# Patient Record
Sex: FEMALE | Race: WHITE | NOT HISPANIC OR LATINO | Employment: FULL TIME | URBAN - METROPOLITAN AREA
[De-identification: names, ages, dates, MRNs, and addresses within clinical notes are randomized per-mention and may not be internally consistent; named-entity substitution may affect disease eponyms.]

---

## 2023-09-22 ENCOUNTER — OFFICE VISIT (OUTPATIENT)
Dept: MEDICAL GROUP | Facility: PHYSICIAN GROUP | Age: 33
End: 2023-09-22
Payer: COMMERCIAL

## 2023-09-22 VITALS
HEIGHT: 64 IN | TEMPERATURE: 97 F | HEART RATE: 83 BPM | WEIGHT: 214 LBS | RESPIRATION RATE: 14 BRPM | OXYGEN SATURATION: 98 % | SYSTOLIC BLOOD PRESSURE: 126 MMHG | BODY MASS INDEX: 36.54 KG/M2 | DIASTOLIC BLOOD PRESSURE: 80 MMHG

## 2023-09-22 DIAGNOSIS — Z00.00 HEALTH CARE MAINTENANCE: ICD-10-CM

## 2023-09-22 DIAGNOSIS — Z11.3 ENCOUNTER FOR SPECIAL SCREENING EXAMINATION FOR INFECTION WITH PREDOMINANTLY SEXUAL MODE OF TRANSMISSION: ICD-10-CM

## 2023-09-22 DIAGNOSIS — K62.5 BRIGHT RED RECTAL BLEEDING: ICD-10-CM

## 2023-09-22 DIAGNOSIS — Z87.440 HISTORY OF RECURRENT UTIS: ICD-10-CM

## 2023-09-22 DIAGNOSIS — R19.7 INTERMITTENT DIARRHEA: ICD-10-CM

## 2023-09-22 PROBLEM — N63.10 BREAST MASS, RIGHT: Status: ACTIVE | Noted: 2023-09-22

## 2023-09-22 PROBLEM — E66.812 CLASS 2 OBESITY WITHOUT SERIOUS COMORBIDITY WITH BODY MASS INDEX (BMI) OF 37.0 TO 37.9 IN ADULT: Status: ACTIVE | Noted: 2023-09-22

## 2023-09-22 PROBLEM — E66.9 CLASS 2 OBESITY WITHOUT SERIOUS COMORBIDITY WITH BODY MASS INDEX (BMI) OF 37.0 TO 37.9 IN ADULT: Status: ACTIVE | Noted: 2023-09-22

## 2023-09-22 PROCEDURE — 99204 OFFICE O/P NEW MOD 45 MIN: CPT | Performed by: STUDENT IN AN ORGANIZED HEALTH CARE EDUCATION/TRAINING PROGRAM

## 2023-09-22 PROCEDURE — 3074F SYST BP LT 130 MM HG: CPT | Performed by: STUDENT IN AN ORGANIZED HEALTH CARE EDUCATION/TRAINING PROGRAM

## 2023-09-22 PROCEDURE — 3079F DIAST BP 80-89 MM HG: CPT | Performed by: STUDENT IN AN ORGANIZED HEALTH CARE EDUCATION/TRAINING PROGRAM

## 2023-09-22 SDOH — ECONOMIC STABILITY: INCOME INSECURITY: HOW HARD IS IT FOR YOU TO PAY FOR THE VERY BASICS LIKE FOOD, HOUSING, MEDICAL CARE, AND HEATING?: NOT VERY HARD

## 2023-09-22 SDOH — ECONOMIC STABILITY: HOUSING INSECURITY: IN THE LAST 12 MONTHS, HOW MANY PLACES HAVE YOU LIVED?: 1

## 2023-09-22 SDOH — ECONOMIC STABILITY: FOOD INSECURITY: WITHIN THE PAST 12 MONTHS, YOU WORRIED THAT YOUR FOOD WOULD RUN OUT BEFORE YOU GOT MONEY TO BUY MORE.: NEVER TRUE

## 2023-09-22 SDOH — HEALTH STABILITY: PHYSICAL HEALTH: ON AVERAGE, HOW MANY DAYS PER WEEK DO YOU ENGAGE IN MODERATE TO STRENUOUS EXERCISE (LIKE A BRISK WALK)?: 5 DAYS

## 2023-09-22 SDOH — ECONOMIC STABILITY: TRANSPORTATION INSECURITY
IN THE PAST 12 MONTHS, HAS LACK OF TRANSPORTATION KEPT YOU FROM MEETINGS, WORK, OR FROM GETTING THINGS NEEDED FOR DAILY LIVING?: NO

## 2023-09-22 SDOH — ECONOMIC STABILITY: HOUSING INSECURITY
IN THE LAST 12 MONTHS, WAS THERE A TIME WHEN YOU DID NOT HAVE A STEADY PLACE TO SLEEP OR SLEPT IN A SHELTER (INCLUDING NOW)?: NO

## 2023-09-22 SDOH — ECONOMIC STABILITY: TRANSPORTATION INSECURITY
IN THE PAST 12 MONTHS, HAS THE LACK OF TRANSPORTATION KEPT YOU FROM MEDICAL APPOINTMENTS OR FROM GETTING MEDICATIONS?: NO

## 2023-09-22 SDOH — ECONOMIC STABILITY: FOOD INSECURITY: WITHIN THE PAST 12 MONTHS, THE FOOD YOU BOUGHT JUST DIDN'T LAST AND YOU DIDN'T HAVE MONEY TO GET MORE.: NEVER TRUE

## 2023-09-22 SDOH — ECONOMIC STABILITY: INCOME INSECURITY: IN THE LAST 12 MONTHS, WAS THERE A TIME WHEN YOU WERE NOT ABLE TO PAY THE MORTGAGE OR RENT ON TIME?: NO

## 2023-09-22 SDOH — HEALTH STABILITY: PHYSICAL HEALTH: ON AVERAGE, HOW MANY MINUTES DO YOU ENGAGE IN EXERCISE AT THIS LEVEL?: 20 MIN

## 2023-09-22 SDOH — ECONOMIC STABILITY: TRANSPORTATION INSECURITY
IN THE PAST 12 MONTHS, HAS LACK OF RELIABLE TRANSPORTATION KEPT YOU FROM MEDICAL APPOINTMENTS, MEETINGS, WORK OR FROM GETTING THINGS NEEDED FOR DAILY LIVING?: NO

## 2023-09-22 SDOH — HEALTH STABILITY: MENTAL HEALTH
STRESS IS WHEN SOMEONE FEELS TENSE, NERVOUS, ANXIOUS, OR CAN'T SLEEP AT NIGHT BECAUSE THEIR MIND IS TROUBLED. HOW STRESSED ARE YOU?: ONLY A LITTLE

## 2023-09-22 ASSESSMENT — SOCIAL DETERMINANTS OF HEALTH (SDOH)
DO YOU BELONG TO ANY CLUBS OR ORGANIZATIONS SUCH AS CHURCH GROUPS UNIONS, FRATERNAL OR ATHLETIC GROUPS, OR SCHOOL GROUPS?: NO
DO YOU BELONG TO ANY CLUBS OR ORGANIZATIONS SUCH AS CHURCH GROUPS UNIONS, FRATERNAL OR ATHLETIC GROUPS, OR SCHOOL GROUPS?: NO
IN A TYPICAL WEEK, HOW MANY TIMES DO YOU TALK ON THE PHONE WITH FAMILY, FRIENDS, OR NEIGHBORS?: TWICE A WEEK
WITHIN THE PAST 12 MONTHS, YOU WORRIED THAT YOUR FOOD WOULD RUN OUT BEFORE YOU GOT THE MONEY TO BUY MORE: NEVER TRUE
HOW OFTEN DO YOU ATTENT MEETINGS OF THE CLUB OR ORGANIZATION YOU BELONG TO?: NEVER
HOW HARD IS IT FOR YOU TO PAY FOR THE VERY BASICS LIKE FOOD, HOUSING, MEDICAL CARE, AND HEATING?: NOT VERY HARD
IN A TYPICAL WEEK, HOW MANY TIMES DO YOU TALK ON THE PHONE WITH FAMILY, FRIENDS, OR NEIGHBORS?: TWICE A WEEK
HOW OFTEN DO YOU GET TOGETHER WITH FRIENDS OR RELATIVES?: ONCE A WEEK
HOW OFTEN DO YOU ATTEND CHURCH OR RELIGIOUS SERVICES?: 1 TO 4 TIMES PER YEAR
HOW OFTEN DO YOU ATTEND CHURCH OR RELIGIOUS SERVICES?: 1 TO 4 TIMES PER YEAR
HOW OFTEN DO YOU HAVE SIX OR MORE DRINKS ON ONE OCCASION: LESS THAN MONTHLY
HOW OFTEN DO YOU GET TOGETHER WITH FRIENDS OR RELATIVES?: ONCE A WEEK
HOW OFTEN DO YOU ATTENT MEETINGS OF THE CLUB OR ORGANIZATION YOU BELONG TO?: NEVER
HOW MANY DRINKS CONTAINING ALCOHOL DO YOU HAVE ON A TYPICAL DAY WHEN YOU ARE DRINKING: 1 OR 2
HOW OFTEN DO YOU HAVE A DRINK CONTAINING ALCOHOL: 2-4 TIMES A MONTH

## 2023-09-22 ASSESSMENT — ENCOUNTER SYMPTOMS
WEIGHT LOSS: 0
FLANK PAIN: 0
SHORTNESS OF BREATH: 0
ABDOMINAL PAIN: 0
FEVER: 0
HEARTBURN: 0
COUGH: 1
HEADACHES: 0
DIARRHEA: 1
CONSTIPATION: 0
VOMITING: 0
CHILLS: 0
BLOOD IN STOOL: 1
NAUSEA: 0
PALPITATIONS: 0

## 2023-09-22 ASSESSMENT — LIFESTYLE VARIABLES
HOW OFTEN DO YOU HAVE A DRINK CONTAINING ALCOHOL: 2-4 TIMES A MONTH
HOW OFTEN DO YOU HAVE SIX OR MORE DRINKS ON ONE OCCASION: LESS THAN MONTHLY
SKIP TO QUESTIONS 9-10: 0
AUDIT-C TOTAL SCORE: 3
HOW MANY STANDARD DRINKS CONTAINING ALCOHOL DO YOU HAVE ON A TYPICAL DAY: 1 OR 2

## 2023-09-22 ASSESSMENT — PATIENT HEALTH QUESTIONNAIRE - PHQ9: CLINICAL INTERPRETATION OF PHQ2 SCORE: 0

## 2023-09-22 NOTE — PATIENT INSTRUCTIONS
I will be transferring to the clinic below October 2023.  [facility]  [address] [place] [address]     Bring records in    fasting labs due 1-2 weeks prior to follow up

## 2023-09-22 NOTE — PROGRESS NOTES
Subjective:     Chief Complaint   Patient presents with    Establish Care     New Pt      HISTORY OF THE PRESENT ILLNESS: Patient is a 32 y.o. female. This pleasant patient is here today to establish care and discuss STI testing. His/her prior PCP years ago, no recent PCP.    Patient without any acute concerns today but we did go over her history.  States that she has had chronic painless bright red rectal bleeding for quite some time.  At times not in association with intermittent diarrhea which seems to be triggered by certain foods such as sweets or things with lactose.  Avoids those in general.  Denies any abdominal pain or other GI complaints.  No family history of inflammatory bowel disease.  Does not seem to have any issues with bread or gluten.  No family history of colon cancer.    Patient requests screening for STI.  Reports that she and her spouse have intercourse with other partners periodically throughout the year, uses condoms.  Denies any STI symptoms currently.  No vaginal discharge.  Having regular periods.   had a vasectomy.    Patient does report a history of chronic UTI, improved from prior.  States that she has had issues with this since 17 years old and never saw a specialist for it.  Could be better with hydration but always wipes front to back and urinates after intercourse.  No current UTI symptoms.    Health Maintenance: Bring in records, declines flu vaccine.    Review of Systems   Constitutional:  Negative for chills, fever, malaise/fatigue and weight loss.        No cold/heat intolerance   Respiratory:  Positive for cough (dry cough this AM). Negative for shortness of breath.    Cardiovascular:  Negative for chest pain, palpitations and leg swelling.   Gastrointestinal:  Positive for blood in stool and diarrhea (only with certain foods (sweets/lactose)). Negative for abdominal pain, constipation, heartburn, nausea and vomiting.   Genitourinary:  Negative for dysuria, flank pain,  "frequency, hematuria and urgency.   Neurological:  Negative for headaches.     Objective:     Exam: /80 (BP Location: Left arm, Patient Position: Sitting, BP Cuff Size: Adult)   Pulse 83   Temp 36.1 °C (97 °F) (Temporal)   Resp 14   Ht 1.619 m (5' 3.75\")   Wt 97.1 kg (214 lb)   SpO2 98%  Body mass index is 37.02 kg/m².    Physical Exam  Vitals reviewed.   Constitutional:       General: She is not in acute distress.     Appearance: Normal appearance. She is obese. She is not ill-appearing.   HENT:      Head: Normocephalic and atraumatic.      Mouth/Throat:      Mouth: Mucous membranes are moist.   Eyes:      General: No scleral icterus.  Neck:      Thyroid: No thyroid mass, thyromegaly or thyroid tenderness.   Cardiovascular:      Rate and Rhythm: Normal rate and regular rhythm.      Heart sounds: Normal heart sounds.   Pulmonary:      Effort: Pulmonary effort is normal.      Breath sounds: Normal breath sounds.   Abdominal:      General: Abdomen is flat. Bowel sounds are normal. There is no distension.      Palpations: Abdomen is soft. There is no mass.      Tenderness: There is no abdominal tenderness. There is no guarding or rebound.   Genitourinary:     Comments: Declines rectal exam  Musculoskeletal:      Cervical back: Normal range of motion and neck supple. No rigidity or tenderness.   Lymphadenopathy:      Cervical: No cervical adenopathy.   Skin:     General: Skin is warm and dry.      Coloration: Skin is not jaundiced.   Neurological:      General: No focal deficit present.      Mental Status: She is alert and oriented to person, place, and time.   Psychiatric:         Mood and Affect: Mood normal.         Behavior: Behavior normal.         Thought Content: Thought content normal.       Assessment & Plan:   32 y.o. female with the following -    1. History of recurrent UTIs  This is a chronic condition, last UTI a few months ago and only twice this year so improved from prior. No current " symptoms. Discussed UTI prevention, would benefit from urology evaluation since this has been chronic since 17.  - Referral to Urology    2. Bright red rectal bleeding  3. Intermittent diarrhea  These are chronic conditions, patient declines rectal exam today but will plan for follow-up.  Labs as below.  Patient to continue to avoid foods that are triggering for diarrhea.  May consider referral to GI if indicated but no current red flags.  - Comp Metabolic Panel; Future  - TSH WITH REFLEX TO FT4; Future  - CBC WITH DIFFERENTIAL; Future  - CELIAC DISEASE AB PANEL; Future    4. Encounter for special screening examination for infection with predominantly sexual mode of transmission  STI testing requested by patient, asymptomatic.  Aware of importance of condom use.  - Chlamydia/GC, PCR (Urine); Future  - HIV AG/AB COMBO ASSAY SCREENING; Future  - HEP C VIRUS ANTIBODY; Future  - HEP B SURFACE ANTIGEN; Future  - T.PALLIDUM AB KAREN (SCREENING); Future    5. Health care maintenance  - Comp Metabolic Panel; Future  - TSH WITH REFLEX TO FT4; Future  - Lipid Profile; Future  - VITAMIN D,25 HYDROXY (DEFICIENCY); Future  - CBC WITH DIFFERENTIAL; Future  - HEMOGLOBIN A1C; Future    Return if symptoms worsen or fail to improve, for Annual with PAP.    Please note that this dictation was created using voice recognition software. I have made every reasonable attempt to correct obvious errors, but I expect that there are errors of grammar and possibly content that I did not discover before finalizing the note.

## 2023-10-05 ENCOUNTER — HOSPITAL ENCOUNTER (OUTPATIENT)
Dept: LAB | Facility: MEDICAL CENTER | Age: 33
End: 2023-10-05
Attending: STUDENT IN AN ORGANIZED HEALTH CARE EDUCATION/TRAINING PROGRAM
Payer: COMMERCIAL

## 2023-10-05 DIAGNOSIS — K62.5 BRIGHT RED RECTAL BLEEDING: ICD-10-CM

## 2023-10-05 DIAGNOSIS — Z00.00 HEALTH CARE MAINTENANCE: ICD-10-CM

## 2023-10-05 DIAGNOSIS — R19.7 INTERMITTENT DIARRHEA: ICD-10-CM

## 2023-10-05 DIAGNOSIS — Z11.3 ENCOUNTER FOR SPECIAL SCREENING EXAMINATION FOR INFECTION WITH PREDOMINANTLY SEXUAL MODE OF TRANSMISSION: ICD-10-CM

## 2023-10-05 LAB
25(OH)D3 SERPL-MCNC: 16 NG/ML (ref 30–100)
ALBUMIN SERPL BCP-MCNC: 4.2 G/DL (ref 3.2–4.9)
ALBUMIN/GLOB SERPL: 1.4 G/DL
ALP SERPL-CCNC: 78 U/L (ref 30–99)
ALT SERPL-CCNC: 15 U/L (ref 2–50)
ANION GAP SERPL CALC-SCNC: 12 MMOL/L (ref 7–16)
AST SERPL-CCNC: 18 U/L (ref 12–45)
BASOPHILS # BLD AUTO: 0.5 % (ref 0–1.8)
BASOPHILS # BLD: 0.05 K/UL (ref 0–0.12)
BILIRUB SERPL-MCNC: 0.4 MG/DL (ref 0.1–1.5)
BUN SERPL-MCNC: 16 MG/DL (ref 8–22)
CALCIUM ALBUM COR SERPL-MCNC: 9.2 MG/DL (ref 8.5–10.5)
CALCIUM SERPL-MCNC: 9.4 MG/DL (ref 8.5–10.5)
CHLORIDE SERPL-SCNC: 105 MMOL/L (ref 96–112)
CHOLEST SERPL-MCNC: 248 MG/DL (ref 100–199)
CO2 SERPL-SCNC: 20 MMOL/L (ref 20–33)
CREAT SERPL-MCNC: 0.81 MG/DL (ref 0.5–1.4)
EOSINOPHIL # BLD AUTO: 0.18 K/UL (ref 0–0.51)
EOSINOPHIL NFR BLD: 1.8 % (ref 0–6.9)
ERYTHROCYTE [DISTWIDTH] IN BLOOD BY AUTOMATED COUNT: 42.4 FL (ref 35.9–50)
EST. AVERAGE GLUCOSE BLD GHB EST-MCNC: 100 MG/DL
FASTING STATUS PATIENT QL REPORTED: NORMAL
GFR SERPLBLD CREATININE-BSD FMLA CKD-EPI: 99 ML/MIN/1.73 M 2
GLOBULIN SER CALC-MCNC: 3.1 G/DL (ref 1.9–3.5)
GLUCOSE SERPL-MCNC: 75 MG/DL (ref 65–99)
HBA1C MFR BLD: 5.1 % (ref 4–5.6)
HBV SURFACE AG SER QL: NORMAL
HCT VFR BLD AUTO: 44.5 % (ref 37–47)
HCV AB SER QL: NORMAL
HDLC SERPL-MCNC: 67 MG/DL
HGB BLD-MCNC: 14.9 G/DL (ref 12–16)
HIV 1+2 AB+HIV1 P24 AG SERPL QL IA: NORMAL
IMM GRANULOCYTES # BLD AUTO: 0.02 K/UL (ref 0–0.11)
IMM GRANULOCYTES NFR BLD AUTO: 0.2 % (ref 0–0.9)
LDLC SERPL CALC-MCNC: 159 MG/DL
LYMPHOCYTES # BLD AUTO: 3.15 K/UL (ref 1–4.8)
LYMPHOCYTES NFR BLD: 31 % (ref 22–41)
MCH RBC QN AUTO: 29.9 PG (ref 27–33)
MCHC RBC AUTO-ENTMCNC: 33.5 G/DL (ref 32.2–35.5)
MCV RBC AUTO: 89.2 FL (ref 81.4–97.8)
MONOCYTES # BLD AUTO: 0.59 K/UL (ref 0–0.85)
MONOCYTES NFR BLD AUTO: 5.8 % (ref 0–13.4)
NEUTROPHILS # BLD AUTO: 6.16 K/UL (ref 1.82–7.42)
NEUTROPHILS NFR BLD: 60.7 % (ref 44–72)
NRBC # BLD AUTO: 0 K/UL
NRBC BLD-RTO: 0 /100 WBC (ref 0–0.2)
PLATELET # BLD AUTO: 339 K/UL (ref 164–446)
PMV BLD AUTO: 10.6 FL (ref 9–12.9)
POTASSIUM SERPL-SCNC: 3.9 MMOL/L (ref 3.6–5.5)
PROT SERPL-MCNC: 7.3 G/DL (ref 6–8.2)
RBC # BLD AUTO: 4.99 M/UL (ref 4.2–5.4)
SODIUM SERPL-SCNC: 137 MMOL/L (ref 135–145)
T PALLIDUM AB SER QL IA: NORMAL
TRIGL SERPL-MCNC: 108 MG/DL (ref 0–149)
TSH SERPL DL<=0.005 MIU/L-ACNC: 2.31 UIU/ML (ref 0.38–5.33)
WBC # BLD AUTO: 10.2 K/UL (ref 4.8–10.8)

## 2023-10-05 PROCEDURE — 86364 TISS TRNSGLTMNASE EA IG CLAS: CPT

## 2023-10-05 PROCEDURE — 87340 HEPATITIS B SURFACE AG IA: CPT

## 2023-10-05 PROCEDURE — 87591 N.GONORRHOEAE DNA AMP PROB: CPT

## 2023-10-05 PROCEDURE — 86780 TREPONEMA PALLIDUM: CPT

## 2023-10-05 PROCEDURE — 82784 ASSAY IGA/IGD/IGG/IGM EACH: CPT

## 2023-10-05 PROCEDURE — 86803 HEPATITIS C AB TEST: CPT

## 2023-10-05 PROCEDURE — 87389 HIV-1 AG W/HIV-1&-2 AB AG IA: CPT

## 2023-10-05 PROCEDURE — 80053 COMPREHEN METABOLIC PANEL: CPT

## 2023-10-05 PROCEDURE — 85025 COMPLETE CBC W/AUTO DIFF WBC: CPT

## 2023-10-05 PROCEDURE — 84443 ASSAY THYROID STIM HORMONE: CPT

## 2023-10-05 PROCEDURE — 83036 HEMOGLOBIN GLYCOSYLATED A1C: CPT

## 2023-10-05 PROCEDURE — 87491 CHLMYD TRACH DNA AMP PROBE: CPT

## 2023-10-05 PROCEDURE — 80061 LIPID PANEL: CPT

## 2023-10-05 PROCEDURE — 82306 VITAMIN D 25 HYDROXY: CPT

## 2023-10-06 LAB
C TRACH DNA SPEC QL NAA+PROBE: NEGATIVE
IGA SERPL-MCNC: 268 MG/DL (ref 68–408)
N GONORRHOEA DNA SPEC QL NAA+PROBE: NEGATIVE
SPECIMEN SOURCE: NORMAL

## 2023-10-07 LAB — TTG IGA SER IA-ACNC: <2 U/ML (ref 0–3)

## 2023-10-23 ENCOUNTER — OFFICE VISIT (OUTPATIENT)
Dept: MEDICAL GROUP | Facility: LAB | Age: 33
End: 2023-10-23
Payer: COMMERCIAL

## 2023-10-23 VITALS
BODY MASS INDEX: 37.8 KG/M2 | SYSTOLIC BLOOD PRESSURE: 118 MMHG | WEIGHT: 221.4 LBS | HEART RATE: 78 BPM | HEIGHT: 64 IN | TEMPERATURE: 96.8 F | DIASTOLIC BLOOD PRESSURE: 78 MMHG | OXYGEN SATURATION: 100 %

## 2023-10-23 DIAGNOSIS — R19.7 INTERMITTENT DIARRHEA: ICD-10-CM

## 2023-10-23 DIAGNOSIS — E78.5 HYPERLIPIDEMIA, UNSPECIFIED HYPERLIPIDEMIA TYPE: ICD-10-CM

## 2023-10-23 DIAGNOSIS — N92.0 MENORRHAGIA WITH REGULAR CYCLE: ICD-10-CM

## 2023-10-23 DIAGNOSIS — E55.9 VITAMIN D DEFICIENCY: ICD-10-CM

## 2023-10-23 DIAGNOSIS — Z23 NEED FOR VACCINATION: ICD-10-CM

## 2023-10-23 PROCEDURE — 3074F SYST BP LT 130 MM HG: CPT | Performed by: STUDENT IN AN ORGANIZED HEALTH CARE EDUCATION/TRAINING PROGRAM

## 2023-10-23 PROCEDURE — 90471 IMMUNIZATION ADMIN: CPT | Performed by: STUDENT IN AN ORGANIZED HEALTH CARE EDUCATION/TRAINING PROGRAM

## 2023-10-23 PROCEDURE — 90715 TDAP VACCINE 7 YRS/> IM: CPT | Performed by: STUDENT IN AN ORGANIZED HEALTH CARE EDUCATION/TRAINING PROGRAM

## 2023-10-23 PROCEDURE — 3078F DIAST BP <80 MM HG: CPT | Performed by: STUDENT IN AN ORGANIZED HEALTH CARE EDUCATION/TRAINING PROGRAM

## 2023-10-23 PROCEDURE — 99213 OFFICE O/P EST LOW 20 MIN: CPT | Mod: 25 | Performed by: STUDENT IN AN ORGANIZED HEALTH CARE EDUCATION/TRAINING PROGRAM

## 2023-10-23 ASSESSMENT — ENCOUNTER SYMPTOMS
CHILLS: 0
DIZZINESS: 0
DIARRHEA: 1
BLOOD IN STOOL: 0
WEIGHT LOSS: 0
HEARTBURN: 0
ABDOMINAL PAIN: 0
CONSTIPATION: 0
NAUSEA: 0
FEVER: 0
HEADACHES: 0
VOMITING: 0

## 2023-10-23 ASSESSMENT — FIBROSIS 4 INDEX: FIB4 SCORE: 0.42

## 2023-10-23 NOTE — PATIENT INSTRUCTIONS
Aim for goal of 150 minutes a week of moderate intensity exercise (ex 30 minutes 5 times a week)    Start Vitamin D3 1000IU/25mcg daily (best in a multivitamin to get folic acid)    For heavy periods:  Can start one of the nonsteroidal anti-inflammatories below on the first day of bleeding day of bleeding and should be continued for two to three days or until period/heavy bleeding stops  Ibuprofen 600 mg 2 to 4 times a day OR  Naproxen 500 mg at onset and three to five hours later, then 250 to 500 mg twice a day  Take with food/plenty of water.

## 2023-10-23 NOTE — PROGRESS NOTES
"Subjective:     Chief Complaint   Patient presents with    Lab Results     HPI:   Emmie presents today to review lab results.    Patient denies any further bright rectal bleeding.  Still with intermittent diarrhea, also notes with menstruation that she has diarrhea as well.  States that menses are quite heavy the first day soaking through a pad and changing every 2 hours but then normalizes and only lasts up to 4 days.  No significant cramping.  Denies abdominal plain.  States that soda, lactose and processed food also cause diarrhea.  She has been trying to avoid this.    We review her diet.  Overall tries to avoid fast food but does have a lot of red meat on the smoker.    Review of Systems   Constitutional:  Negative for chills, fever, malaise/fatigue and weight loss.   Gastrointestinal:  Positive for diarrhea. Negative for abdominal pain, blood in stool, constipation, heartburn, nausea and vomiting.   Neurological:  Negative for dizziness and headaches.     Objective:     Exam:  /78 (BP Location: Left arm, Patient Position: Sitting, BP Cuff Size: Adult)   Pulse 78   Temp 36 °C (96.8 °F) (Temporal)   Ht 1.626 m (5' 4\")   Wt 100 kg (221 lb 6.4 oz)   SpO2 100%   BMI 38.00 kg/m²  Body mass index is 38 kg/m².    Physical Exam  Vitals reviewed.   Constitutional:       General: She is not in acute distress.     Appearance: Normal appearance. She is obese. She is not ill-appearing.   HENT:      Head: Normocephalic and atraumatic.      Mouth/Throat:      Mouth: Mucous membranes are moist.   Eyes:      General: No scleral icterus.  Cardiovascular:      Rate and Rhythm: Normal rate and regular rhythm.      Heart sounds: Normal heart sounds.   Pulmonary:      Effort: Pulmonary effort is normal.      Breath sounds: Normal breath sounds.   Abdominal:      General: Abdomen is flat. Bowel sounds are normal. There is no distension.      Palpations: Abdomen is soft. There is no mass.      Tenderness: There is " abdominal tenderness (minimal nonreproducible tenderness lower abdominal, epigastric and right upper quadrant). There is no guarding or rebound.   Skin:     General: Skin is warm and dry.      Coloration: Skin is not jaundiced.   Neurological:      General: No focal deficit present.      Mental Status: She is alert and oriented to person, place, and time.   Psychiatric:         Mood and Affect: Mood normal.         Behavior: Behavior normal.         Thought Content: Thought content normal.       Labs: Reviewed from 10/5/2023    Assessment & Plan:     32 y.o. female with the following -     1. Intermittent diarrhea  Chronic, mostly related to certain dietary indiscretions that she will continue to avoid.  Gave return precautions.    2. Vitamin D deficiency  New finding, advised 1000 international units D3 daily in the form of multivitamin to get folic acid as well.    3. Hyperlipidemia, unspecified hyperlipidemia type  Chronic, not at threshold for statin. Discussed ideal ranges and ways to improve with lifestyle choices.    4. Menorrhagia with regular cycle  Chronic condition, no anemia.  Advised trial of oral NSAID (ibuprofen or naproxen-directions provided in after visit summary) and plan on pelvic exam at follow-up for Pap.  Declines contraception.    5. Need for vaccination  - Tdap =>6yo IM    I spent a total of 26 minutes with record review, exam, communication with the patient, and documentation of this encounter.    Return in about 18 days (around 11/10/2023), or if symptoms worsen or fail to improve, for Annual with PAP.    Please note that this dictation was created using voice recognition software. I have made every reasonable attempt to correct obvious errors, but I expect that there are errors of grammar and possibly content that I did not discover before finalizing the note.

## 2023-10-23 NOTE — PROGRESS NOTES
Subjective:     Chief Complaint   Patient presents with    Lab Results       HPI:   Emmie Mohamud is a 32 y.o. female who presents for annual exam.     Ob-Gyn/ History:    Patient has GYN provider: no  /Para:    Last Pap Smear:  >8yrs ago. No history of abnormal pap smears.  Gyn Surgery:  denies.  Current Contraceptive Method: Condom if other partner. Patient is currently sexually active without complaints of dyspareunia.  Urinary incontinence: denies    Lasts 4-5 days, heavy the first day. Bleeds thru pad (long) the first day and changes Q2. Small clots at times. Mild cramps, takes OTC medication at times which works.  No significant bloating/fluid retention, pelvic pain, or abnormal vaginal discharge.  Folate intake: advised    Health Maintenance  Cholesterol Screenin   Diabetes Screenin   Aspirin Use: NI    Diet: ***   Exercise: Trying to increase   Substance Abuse: ***     Cancer screening  Colorectal Cancer Screening: discussed   Lung Cancer Screening: NI  Cervical Cancer Screening: today   Breast Cancer Screening: at 39yo discuss    Infectious disease screening/Immunizations  --STI Screening: UTD   --Practices safe sex.  --HIV Screening: negative   --Hepatitis C Screening: negative   --Immunizations: Reviewed with patient.     She  has a past medical history of Urinary tract infection.  She  has a past surgical history that includes dental extraction(s).    Family History   Problem Relation Age of Onset    Thyroid Mother     Mental Illness Mother         anxiety with panic, PTSD, depression    Other Brother         Down's syndrome    Thyroid Paternal Grandmother     Cancer Paternal Grandmother         liver or pancreas?    Heart Attack Paternal Grandfather 67    Other Daughter         speech delay    Other Son         developmental delay    Breast Cancer Other         mothers aunt    Colon Cancer Neg Hx        Social History     Socioeconomic History    Marital status: Single      Spouse name: Not on file    Number of children: Not on file    Years of education: Not on file    Highest education level: Master's degree (e.g., MA, MS, [place], MEd, MSW, DILEEP)   Occupational History    Not on file   Tobacco Use    Smoking status: Never    Smokeless tobacco: Never   Vaping Use    Vaping Use: Never used   Substance and Sexual Activity    Alcohol use: Yes     Comment: social    Drug use: Yes     Types: Marijuana, Inhaled     Comment: every few weeks    Sexual activity: Yes     Partners: Female, Male     Birth control/protection: Condom, Male Sterilization   Other Topics Concern    Not on file   Social History Narrative    Not on file     Social Determinants of Health     Financial Resource Strain: Low Risk  (9/22/2023)    Overall Financial Resource Strain (CARDIA)     Difficulty of Paying Living Expenses: Not very hard   Food Insecurity: No Food Insecurity (9/22/2023)    Hunger Vital Sign     Worried About Running Out of Food in the Last Year: Never true     Ran Out of Food in the Last Year: Never true   Transportation Needs: No Transportation Needs (9/22/2023)    PRAPARE - Transportation     Lack of Transportation (Medical): No     Lack of Transportation (Non-Medical): No   Physical Activity: Insufficiently Active (9/22/2023)    Exercise Vital Sign     Days of Exercise per Week: 5 days     Minutes of Exercise per Session: 20 min   Stress: No Stress Concern Present (9/22/2023)    [employer] [place] [employer] - Occupational Stress Questionnaire     Feeling of Stress : Only a little   Social Connections: Moderately Integrated (9/22/2023)    Social Connection and Isolation Panel [NHANES]     Frequency of Communication with Friends and Family: Twice a week     Frequency of Social Gatherings with Friends and Family: Once a week     Attends Latter day Services: 1 to 4 times per year     Active Member of Clubs or Organizations: No     Attends Club or Organization Meetings: Never     Marital  "Status:    Intimate Partner Violence: Not on file   Housing Stability: Low Risk  (9/22/2023)    Housing Stability Vital Sign     Unable to Pay for Housing in the Last Year: No     Number of Places Lived in the Last Year: 1     Unstable Housing in the Last Year: No       Patient Active Problem List    Diagnosis Date Noted    Vitamin D deficiency 10/23/2023    Hyperlipemia 10/23/2023    BMI 38.0-38.9,adult 09/22/2023    Class 2 obesity without serious comorbidity with body mass index (BMI) of 38.0 to 38.9 in adult 09/22/2023    History of recurrent UTIs 09/22/2023    Bright red rectal bleeding 09/22/2023    Breast mass, right 09/22/2023    Intermittent diarrhea 09/22/2023         Current Outpatient Medications   Medication Sig Dispense Refill    Probiotic Product (PROBIOTIC DAILY PO) Take  by mouth every day.       No current facility-administered medications for this visit.     No Known Allergies    Review of Systems ***  Constitutional: Negative for fever, chills and malaise/fatigue.   HENT: Negative for congestion.    Eyes: Negative for pain.    Respiratory: Negative for cough and shortness of breath.  Cardiovascular: Negative for leg swelling.   Gastrointestinal: Negative for nausea, vomiting, abdominal pain and diarrhea.   Genitourinary: Negative for dysuria and hematuria.   Skin: Negative for rash.   Neurological: Negative for dizziness, focal weakness and headaches.   Endo/Heme/Allergies: Does not bleed easily.   Psychiatric/Behavioral: Negative for depression.  The patient is not nervous/anxious.      Objective:     /78 (BP Location: Left arm, Patient Position: Sitting, BP Cuff Size: Adult)   Pulse 78   Temp 36 °C (96.8 °F) (Temporal)   Ht 1.626 m (5' 4\")   Wt 100 kg (221 lb 6.4 oz)   SpO2 100%   BMI 38.00 kg/m²   Body mass index is 38 kg/m².  Wt Readings from Last 4 Encounters:   10/23/23 100 kg (221 lb 6.4 oz)   09/22/23 97.1 kg (214 lb)       Physical Exam:  Constitutional: Well-developed " and well-nourished. Not diaphoretic. No distress.   Skin: Skin is warm and dry. No rash noted.  Head: Atraumatic without lesions.  Eyes: Conjunctivae and extraocular motions are normal. Pupils are equal, round. No scleral icterus.   Ears:  External ears unremarkable. Tympanic membranes clear and intact.  Nose: Nares patent. No discharge.   Mouth/Throat: Dentition is ***. Tongue normal. Oropharynx is clear and moist. Posterior pharynx without erythema or exudates.  Neck: Supple, trachea midline. Normal range of motion. No thyromegaly present. No lymphadenopathy--cervical or supraclavicular.  Cardiovascular: Regular rate and rhythm, S1 and S2 without murmur, rubs, or gallops.  Lungs: Normal inspiratory effort, CTA bilaterally, no wheezes/rhonchi/rales  Breast: Breasts examined seated and supine. No skin changes, peau d'orange or nipple retraction. No discharge. No axillary or supraclavicular adenopathy. No masses or nodularity palpable. ***  Abdomen: Soft, non tender, and without distention. Active bowel sounds in all four quadrants. No rebound, guarding, masses or HSM.  :Perineum and external genitalia normal without rash. Vagina with {GYN VAGINAL DISCHARGE:721} discharge. Cervix without visible lesions or discharge. Bimanual exam without adnexal masses or cervical motion tenderness.***  Extremities: No cyanosis, clubbing, erythema, nor edema.   Musculoskeletal: ***  Neurological: Alert and oriented x 3. No gross or focal deficits.   Psychiatric:  Behavior, mood, and affect are appropriate.    A chaperone was offered to the patient during today's exam. {CHAPERONE:74809}    Labs: Reviewed from ***  Imaging: Reviewed from ***    Assessment and Plan:     1. Vitamin D deficiency    2. Hyperlipidemia, unspecified hyperlipidemia type       HCM: Reviewed with patient as above.  Immunizations discussed/recommended.  Age-appropriate anticipatory guidance discussed: sun screen, dental visits, healthy diet/exercise, AHA  recommendation for ETOH intake and tobacco cessation.      Current Outpatient Medications:     Probiotic Product (PROBIOTIC DAILY PO), Take  by mouth every day., Disp: , Rfl:     Follow-up: Return in about 18 days (around 11/10/2023), or if symptoms worsen or fail to improve, for Annual with PAP.

## 2023-10-23 NOTE — PROGRESS NOTES
"Subjective:     Chief Complaint   Patient presents with    Lab Results     HPI:   Emmie presents today to go over her lab results.    No problem-specific Assessment & Plan notes found for this encounter.    Health Maintenance: {Reviewed with patient.}    ROS    Objective:     Exam:  /78 (BP Location: Left arm, Patient Position: Sitting, BP Cuff Size: Adult)   Pulse 78   Temp 36 °C (96.8 °F) (Temporal)   Ht 1.626 m (5' 4\")   Wt 100 kg (221 lb 6.4 oz)   SpO2 100%   BMI 38.00 kg/m²  Body mass index is 38 kg/m².    Physical Exam    Labs: Reviewed from 10//5/2023  Imaging: Reviewed from ***    Assessment & Plan:     32 y.o. female with the following -     1. Vitamin D deficiency    2. Hyperlipidemia, unspecified hyperlipidemia type        Current Outpatient Medications:     Probiotic Product (PROBIOTIC DAILY PO), Take  by mouth every day., Disp: , Rfl:     I spent a total of *** minutes with record review, exam, communication with the patient, and documentation of this encounter.      Return in about 18 days (around 11/10/2023), or if symptoms worsen or fail to improve, for Annual with PAP.    Please note that this dictation was created using voice recognition software. I have made every reasonable attempt to correct obvious errors, but I expect that there are errors of grammar and possibly content that I did not discover before finalizing the note.        "

## 2023-11-10 ENCOUNTER — HOSPITAL ENCOUNTER (OUTPATIENT)
Facility: MEDICAL CENTER | Age: 33
End: 2023-11-10
Attending: STUDENT IN AN ORGANIZED HEALTH CARE EDUCATION/TRAINING PROGRAM
Payer: COMMERCIAL

## 2023-11-10 ENCOUNTER — OFFICE VISIT (OUTPATIENT)
Dept: MEDICAL GROUP | Facility: LAB | Age: 33
End: 2023-11-10
Payer: COMMERCIAL

## 2023-11-10 VITALS
OXYGEN SATURATION: 96 % | TEMPERATURE: 97.5 F | WEIGHT: 221 LBS | RESPIRATION RATE: 16 BRPM | SYSTOLIC BLOOD PRESSURE: 116 MMHG | HEIGHT: 64 IN | DIASTOLIC BLOOD PRESSURE: 64 MMHG | BODY MASS INDEX: 37.73 KG/M2 | HEART RATE: 77 BPM

## 2023-11-10 DIAGNOSIS — Z12.4 ENCOUNTER FOR PAPANICOLAOU SMEAR FOR CERVICAL CANCER SCREENING: ICD-10-CM

## 2023-11-10 DIAGNOSIS — E55.9 VITAMIN D DEFICIENCY: ICD-10-CM

## 2023-11-10 DIAGNOSIS — Z11.3 ENCOUNTER FOR SPECIAL SCREENING EXAMINATION FOR INFECTION WITH PREDOMINANTLY SEXUAL MODE OF TRANSMISSION: ICD-10-CM

## 2023-11-10 DIAGNOSIS — Z11.51 ENCOUNTER FOR SCREENING FOR HUMAN PAPILLOMAVIRUS (HPV): ICD-10-CM

## 2023-11-10 DIAGNOSIS — E78.5 HYPERLIPIDEMIA, UNSPECIFIED HYPERLIPIDEMIA TYPE: ICD-10-CM

## 2023-11-10 DIAGNOSIS — N92.0 MENORRHAGIA WITH REGULAR CYCLE: ICD-10-CM

## 2023-11-10 DIAGNOSIS — Z01.419 WELL WOMAN EXAM: ICD-10-CM

## 2023-11-10 PROBLEM — K62.5 BRIGHT RED RECTAL BLEEDING: Status: RESOLVED | Noted: 2023-09-22 | Resolved: 2023-11-10

## 2023-11-10 PROBLEM — N63.10 BREAST MASS, RIGHT: Status: RESOLVED | Noted: 2023-09-22 | Resolved: 2023-11-10

## 2023-11-10 PROCEDURE — 99395 PREV VISIT EST AGE 18-39: CPT | Performed by: STUDENT IN AN ORGANIZED HEALTH CARE EDUCATION/TRAINING PROGRAM

## 2023-11-10 PROCEDURE — 99214 OFFICE O/P EST MOD 30 MIN: CPT | Mod: 25 | Performed by: STUDENT IN AN ORGANIZED HEALTH CARE EDUCATION/TRAINING PROGRAM

## 2023-11-10 PROCEDURE — 88175 CYTOPATH C/V AUTO FLUID REDO: CPT

## 2023-11-10 PROCEDURE — 87624 HPV HI-RISK TYP POOLED RSLT: CPT

## 2023-11-10 PROCEDURE — 3074F SYST BP LT 130 MM HG: CPT | Performed by: STUDENT IN AN ORGANIZED HEALTH CARE EDUCATION/TRAINING PROGRAM

## 2023-11-10 PROCEDURE — 3078F DIAST BP <80 MM HG: CPT | Performed by: STUDENT IN AN ORGANIZED HEALTH CARE EDUCATION/TRAINING PROGRAM

## 2023-11-10 ASSESSMENT — FIBROSIS 4 INDEX: FIB4 SCORE: 0.42

## 2023-11-10 NOTE — PROGRESS NOTES
Subjective:     Chief Complaint   Patient presents with    Annual Exam    Gynecologic Exam     HPI:   Emmie Mohamud is a 32 y.o. female who presents for annual exam.     Ob-Gyn/ History:    Patient has GYN provider: no  /Para:    Last Pap Smear:  >8yrs ago. No history of abnormal pap smears.  Gyn Surgery:  denies.  Current Contraceptive Method: Condom if other partner other than spouse. Patient is currently sexually active without complaints of dyspareunia.  Urinary incontinence: denies    LMP started 2 days ago. Menses lasts 4-5 days, heavy the first day. Bleeds thru pad (long) the first day and changes Q2. Small clots at times. Mild cramps, takes OTC medication at times which works. No abnormal vaginal discharge.  Folate intake: advised, pt looking for a MVI she likes    Health Maintenance  Osteoporosis Screen/ DEXA: at 65   Cholesterol Screenin   Diabetes Screenin   Aspirin Use: NI    Diet: Discussed last visit   Exercise: Discussed prior   Substance Abuse: Marijuana every few weeks, social alcohol-not excessive    Cancer screening  Colorectal Cancer Screening: at 47yo   Lung Cancer Screening: NI    Cervical Cancer Screening: today   Breast Cancer Screening: at 42yo    Infectious disease screening/Immunizations  --STI Screening: UTD   --Practices safe sex.  --HIV Screening: negative   --Hepatitis C Screenin   --Immunizations: Reviewed with patient.     She  has a past medical history of Breast mass, right (2023), Bright red rectal bleeding (2023), and Urinary tract infection.  She  has a past surgical history that includes dental extraction(s).    Family History   Problem Relation Age of Onset    Thyroid Mother     Mental Illness Mother         anxiety with panic, PTSD, depression    Other Brother         Down's syndrome    Thyroid Paternal Grandmother     Cancer Paternal Grandmother         liver or pancreas?    Heart Attack Paternal Grandfather 67     Other Daughter         speech delay    Other Son         developmental delay    Breast Cancer Other         mothers aunt    Colon Cancer Neg Hx      Social History     Socioeconomic History    Marital status: Single     Spouse name: Not on file    Number of children: Not on file    Years of education: Not on file    Highest education level: Master's degree (e.g., MA, MS, [place], MEd, MSW, DILEEP)   Occupational History    Not on file   Tobacco Use    Smoking status: Never    Smokeless tobacco: Never   Vaping Use    Vaping Use: Never used   Substance and Sexual Activity    Alcohol use: Yes     Comment: social    Drug use: Yes     Types: Marijuana, Inhaled     Comment: every few weeks    Sexual activity: Yes     Partners: Female, Male     Birth control/protection: Condom, Male Sterilization   Other Topics Concern    Not on file   Social History Narrative    Not on file     Social Determinants of Health     Financial Resource Strain: Low Risk  (9/22/2023)    Overall Financial Resource Strain (CARDIA)     Difficulty of Paying Living Expenses: Not very hard   Food Insecurity: No Food Insecurity (9/22/2023)    Hunger Vital Sign     Worried About Running Out of Food in the Last Year: Never true     Ran Out of Food in the Last Year: Never true   Transportation Needs: No Transportation Needs (9/22/2023)    PRAPARE - Transportation     Lack of Transportation (Medical): No     Lack of Transportation (Non-Medical): No   Physical Activity: Insufficiently Active (9/22/2023)    Exercise Vital Sign     Days of Exercise per Week: 5 days     Minutes of Exercise per Session: 20 min   Stress: No Stress Concern Present (9/22/2023)    [employer] [employer] - Occupational Stress Questionnaire     Feeling of Stress : Only a little   Social Connections: Moderately Integrated (9/22/2023)    Social Connection and Isolation Panel [NHANES]     Frequency of Communication with Friends and Family: Twice a week     Frequency of Social  "Gatherings with Friends and Family: Once a week     Attends [employer] Services: 1 to 4 times per year     Active Member of Clubs or Organizations: No     Attends Club or Organization Meetings: Never     Marital Status:    Intimate Partner Violence: Not on file   Housing Stability: Low Risk  (9/22/2023)    Housing Stability Vital Sign     Unable to Pay for Housing in the Last Year: No     Number of Places Lived in the Last Year: 1     Unstable Housing in the Last Year: No     Patient Active Problem List    Diagnosis Date Noted    Vitamin D deficiency 10/23/2023    Hyperlipemia 10/23/2023    Menorrhagia with regular cycle 10/23/2023    BMI 38.0-38.9,adult 09/22/2023    Class 2 obesity without serious comorbidity with body mass index (BMI) of 38.0 to 38.9 in adult 09/22/2023    History of recurrent UTIs 09/22/2023    Intermittent diarrhea 09/22/2023     Current Outpatient Medications   Medication Sig Dispense Refill    Probiotic Product (PROBIOTIC DAILY PO) Take  by mouth every day.       No current facility-administered medications for this visit.     No Known Allergies    Review of Systems   Constitutional: Negative for fever, chills and malaise/fatigue.   HENT: Negative for congestion.    Eyes: Negative for pain.    Respiratory: Negative for cough and shortness of breath.  Cardiovascular: Negative for leg swelling.   Gastrointestinal: Negative for nausea, vomiting, abdominal pain. No further blood in the stool.  Genitourinary: Negative for dysuria and hematuria. Followed by urology.  Skin: Negative for rash.   Neurological: Negative for dizziness, focal weakness and headaches.   Endo/Heme/Allergies: Does not bleed easily.   Psychiatric/Behavioral: Negative for depression.  The patient is not nervous/anxious.      Objective:     /64   Pulse 77   Temp 36.4 °C (97.5 °F)   Resp 16   Ht 1.626 m (5' 4\")   Wt 100 kg (221 lb)   SpO2 96%   BMI 37.93 kg/m²   Body mass index is 37.93 kg/m².  Wt Readings " from Last 4 Encounters:   11/10/23 100 kg (221 lb)   10/23/23 100 kg (221 lb 6.4 oz)   09/22/23 97.1 kg (214 lb)     Physical Exam:  Constitutional: Well-developed and well-nourished. Not diaphoretic. No distress. Obese.  Skin: Skin is warm and dry. No rash noted. Fleshy non tender papule on left buttock near but not associated with anus.  Head: Atraumatic without lesions.  Eyes: Conjunctivae and extraocular motions are normal. Pupils are equal, round. No scleral icterus.   Ears:  External ears unremarkable. Tympanic membranes clear and intact.  Nose: Nares patent. No discharge.   Mouth/Throat: Dentition is good. Tongue normal. Oropharynx is clear and moist. Posterior pharynx without erythema or exudates.  Neck: Supple, trachea midline. Normal range of motion. No thyromegaly present. No lymphadenopathy--cervical or supraclavicular.  Cardiovascular: Regular rate and rhythm, S1 and S2 without murmur, rubs, or gallops.  Lungs: Normal inspiratory effort, CTA bilaterally, no wheezes/rhonchi/rales  Breast: Declined exam  Abdomen: Soft, non tender, and without distention. Active bowel sounds in all four quadrants. No rebound, guarding, masses or HSM.  : Perineum and external genitalia normal without rash. Vagina with scant bloody discharge. Cervix without visible lesions or discharge. Bimanual exam without adnexal masses or cervical motion tenderness (limited by habitus)  Rectal: External only-hemorrhoidal skin tag.  Extremities: No cyanosis, clubbing, erythema, nor edema.   Neurological: Alert and oriented x 3. No gross or focal deficits.   Psychiatric:  Behavior, mood, and affect are appropriate.    A chaperone was offered to the patient during today's exam. Chaperone name: Elin Gonzalez MA was present.    Labs: Reviewed from 10/5/2023    Assessment and Plan:     1. Well woman exam  HCM: Reviewed with patient as above.  Immunizations discussed/recommended.  Age-appropriate anticipatory guidance discussed: dental  visits. Aware of importance of safe sex and healthy diet/exercise    2. Encounter for screening for human papillomavirus (HPV)  3. Encounter for Papanicolaou smear for cervical cancer screening  Repeat in 5 years if cotest negative.  - THINPREP PAP W/HPV ; Future    4. Menorrhagia with regular cycle  Chronic condition, no anemia.  Discussed again trial of oral NSAID (ibuprofen or naproxen-directions provided in after visit summary). Declines contraception.  Evaluate with US as below.  - CBC WITH DIFFERENTIAL; Future  - US-PELVIC COMPLETE (TRANSABDOMINAL/TRANSVAGINAL) (COMBO); Future    5. Vitamin D deficiency  Advised 1000IU daily D3, trend with annual labs next year.  - VITAMIN D,25 HYDROXY (DEFICIENCY); Future    6. Hyperlipidemia, unspecified hyperlipidemia type  Discussed last visit. Trend next year.  - Comp Metabolic Panel; Future  - TSH WITH REFLEX TO FT4; Future  - Lipid Profile; Future    7. Encounter for special screening examination for infection with predominantly sexual mode of transmission  Requested annually by patient  - Chlamydia/GC, PCR (Urine); Future  - HIV AG/AB COMBO ASSAY SCREENING; Future  - HEP C VIRUS ANTIBODY; Future  - HEP B SURFACE ANTIGEN; Future  - T.PALLIDUM AB KAREN (SCREENING); Future     Follow-up: Return in 1 year (on 11/10/2024), or if symptoms worsen or fail to improve, for Annual.

## 2023-11-10 NOTE — PATIENT INSTRUCTIONS
Multi with D3 1000IU daily    Dental appointments (ideally every 6 months)    fasting labs due 10/2024    For heavy periods:  Can start one of the nonsteroidal anti-inflammatories below on the first day of bleeding day of bleeding and should be continued for two to three days or until period/heavy bleeding stops  Ibuprofen 600 mg 2 to 4 times a day OR  Naproxen 500 mg at onset and three to five hours later, then 250 to 500 mg twice a day  Take with food/plenty of water.

## 2023-11-15 LAB
CYTOLOGIST CVX/VAG CYTO: NORMAL
CYTOLOGY CVX/VAG DOC CYTO: NORMAL
CYTOLOGY CVX/VAG DOC THIN PREP: NORMAL
HPV I/H RISK 4 DNA CVX QL PROBE+SIG AMP: NEGATIVE
NOTE NL11727A: NORMAL
OTHER STN SPEC: NORMAL
STAT OF ADQ CVX/VAG CYTO-IMP: NORMAL

## 2023-12-19 ENCOUNTER — OFFICE VISIT (OUTPATIENT)
Dept: URGENT CARE | Facility: PHYSICIAN GROUP | Age: 33
End: 2023-12-19
Payer: COMMERCIAL

## 2023-12-19 VITALS
WEIGHT: 226.8 LBS | TEMPERATURE: 98.4 F | HEIGHT: 64 IN | RESPIRATION RATE: 16 BRPM | DIASTOLIC BLOOD PRESSURE: 74 MMHG | HEART RATE: 96 BPM | SYSTOLIC BLOOD PRESSURE: 110 MMHG | OXYGEN SATURATION: 98 % | BODY MASS INDEX: 38.72 KG/M2

## 2023-12-19 DIAGNOSIS — H92.01 ACUTE OTALGIA, RIGHT: ICD-10-CM

## 2023-12-19 PROCEDURE — 3078F DIAST BP <80 MM HG: CPT | Performed by: STUDENT IN AN ORGANIZED HEALTH CARE EDUCATION/TRAINING PROGRAM

## 2023-12-19 PROCEDURE — 99213 OFFICE O/P EST LOW 20 MIN: CPT | Performed by: STUDENT IN AN ORGANIZED HEALTH CARE EDUCATION/TRAINING PROGRAM

## 2023-12-19 PROCEDURE — 3074F SYST BP LT 130 MM HG: CPT | Performed by: STUDENT IN AN ORGANIZED HEALTH CARE EDUCATION/TRAINING PROGRAM

## 2023-12-19 RX ORDER — NORELGESTROMIN AND ETHINYL ESTRADIOL 35; 150 UG/MG; UG/MG
PATCH TRANSDERMAL
COMMUNITY
End: 2023-12-19

## 2023-12-19 ASSESSMENT — FIBROSIS 4 INDEX: FIB4 SCORE: 0.44

## 2023-12-20 ENCOUNTER — HOSPITAL ENCOUNTER (OUTPATIENT)
Dept: RADIOLOGY | Facility: MEDICAL CENTER | Age: 33
End: 2023-12-20
Attending: PHYSICIAN ASSISTANT
Payer: COMMERCIAL

## 2023-12-20 DIAGNOSIS — N39.0 URINARY TRACT INFECTION WITHOUT HEMATURIA, SITE UNSPECIFIED: ICD-10-CM

## 2023-12-20 PROCEDURE — 76775 US EXAM ABDO BACK WALL LIM: CPT

## 2023-12-20 NOTE — PROGRESS NOTES
Subjective:   CHIEF COMPLAINT  Chief Complaint   Patient presents with    Earache     Bilateral ear pain, pressure, feels like water in ear, x3 days        HPI  Emmie Mohamud is a 33 y.o. female who presents with a chief complaint of bilateral ear pain.  Symptoms started with the right ear 3 days ago then developed left ear pain the following day.  Says she feels like there is water in her ears.  She has tried OTC eardrops which did not help.  She is uncertain if there has been any drainage from the ear.  Positive ROS for sore throat secondary to PND.  No cough or fevers.  No recent swimming.    REVIEW OF SYSTEMS  General: no fever or chills  GI: no nausea or vomiting  See HPI for further details.    PAST MEDICAL HISTORY  Patient Active Problem List    Diagnosis Date Noted    Vitamin D deficiency 10/23/2023    Hyperlipemia 10/23/2023    Menorrhagia with regular cycle 10/23/2023    BMI 38.0-38.9,adult 09/22/2023    Class 2 obesity without serious comorbidity with body mass index (BMI) of 38.0 to 38.9 in adult 09/22/2023    History of recurrent UTIs 09/22/2023    Intermittent diarrhea 09/22/2023       SURGICAL HISTORY   has a past surgical history that includes dental extraction(s).    ALLERGIES  No Known Allergies    CURRENT MEDICATIONS  Home Medications       Reviewed by Dustin Rogel D.O. (Physician) on 12/19/23 at 1744  Med List Status: <None>     Medication Last Dose Status        Patient Rogers Taking any Medications                           SOCIAL HISTORY  Social History     Tobacco Use    Smoking status: Never    Smokeless tobacco: Never   Vaping Use    Vaping Use: Never used   Substance and Sexual Activity    Alcohol use: Not Currently    Drug use: Yes     Types: Marijuana, Inhaled     Comment: every few weeks    Sexual activity: Yes     Partners: Female, Male     Birth control/protection: Condom, Male Sterilization       FAMILY HISTORY  Family History   Problem Relation Age of Onset    Thyroid  "Mother     Mental Illness Mother         anxiety with panic, PTSD, depression    Other Brother         Down's syndrome    Thyroid Paternal Grandmother     Cancer Paternal Grandmother         liver or pancreas?    Heart Attack Paternal Grandfather 67    Other Daughter         speech delay    Other Son         developmental delay    Breast Cancer Other         mothers aunt    Colon Cancer Neg Hx           Objective:   PHYSICAL EXAM  VITAL SIGNS: /74 (BP Location: Right arm, Patient Position: Sitting, BP Cuff Size: Adult)   Pulse 96   Temp 36.9 °C (98.4 °F) (Temporal)   Resp 16   Ht 1.626 m (5' 4\")   Wt 103 kg (226 lb 12.8 oz)   SpO2 98%   BMI 38.93 kg/m²     Gen: no acute distress, normal voice  Skin: dry, intact, moist mucosal membranes  Eyes: No conjunctival injection b/l  Neck: Normal range of motion. No meningeal signs.   ENT: No oropharyngeal erythema or exudates. Uvula midline. TMs clear and intact b/l w/o bulging, erythema or effusion.  Right anterior lymphadenopathy.  Lungs: No increased work of breathing.  CTAB w/ symmetric expansion  CV: RRR w/o murmurs or clicks  Psych: normal affect, normal judgement, alert, awake    Assessment/Plan:     1. Acute otalgia, right        Suspect viral etiology given sore throat and adenopathy.  No evidence of AOM on examination.  -Ibuprofen 800 mg every 8 hours as needed for pain  -Continue daily Claritin  -Recommended starting Flonase 2 sprays each nostril qhs  -Return to urgent care any new/worsening symptoms or further questions or concerns.  Patient understood everything discussed.  All questions were answered.      Differential diagnosis and supportive care discussed. Follow-up as needed if symptoms worsen or fail to improve to PCP, Urgent care or Emergency Room.    Please note that this dictation was created using voice recognition software. I have made a reasonable attempt to correct obvious errors, but I expect that there are errors of grammar and " possibly content that I did not discover before finalizing the note.

## 2023-12-21 ENCOUNTER — HOSPITAL ENCOUNTER (OUTPATIENT)
Dept: RADIOLOGY | Facility: MEDICAL CENTER | Age: 33
End: 2023-12-21
Attending: STUDENT IN AN ORGANIZED HEALTH CARE EDUCATION/TRAINING PROGRAM
Payer: COMMERCIAL

## 2023-12-21 DIAGNOSIS — N92.0 MENORRHAGIA WITH REGULAR CYCLE: ICD-10-CM

## 2023-12-21 PROCEDURE — 76830 TRANSVAGINAL US NON-OB: CPT

## 2024-01-04 ENCOUNTER — HOSPITAL ENCOUNTER (OUTPATIENT)
Facility: MEDICAL CENTER | Age: 34
End: 2024-01-04
Attending: PHYSICIAN ASSISTANT
Payer: COMMERCIAL

## 2024-01-04 PROCEDURE — 81001 URINALYSIS AUTO W/SCOPE: CPT

## 2024-01-05 LAB
APPEARANCE UR: ABNORMAL
BACTERIA #/AREA URNS HPF: ABNORMAL /HPF
BILIRUB UR QL STRIP.AUTO: NEGATIVE
COLOR UR: YELLOW
EPI CELLS #/AREA URNS HPF: ABNORMAL /HPF
GLUCOSE UR STRIP.AUTO-MCNC: NEGATIVE MG/DL
HYALINE CASTS #/AREA URNS LPF: ABNORMAL /LPF
KETONES UR STRIP.AUTO-MCNC: NEGATIVE MG/DL
LEUKOCYTE ESTERASE UR QL STRIP.AUTO: NEGATIVE
MICRO URNS: ABNORMAL
NITRITE UR QL STRIP.AUTO: NEGATIVE
PH UR STRIP.AUTO: 5.5 [PH] (ref 5–8)
PROT UR QL STRIP: NEGATIVE MG/DL
RBC # URNS HPF: ABNORMAL /HPF
RBC UR QL AUTO: ABNORMAL
RENAL EPI CELLS #/AREA URNS HPF: ABNORMAL /HPF
SP GR UR STRIP.AUTO: 1.02
UROBILINOGEN UR STRIP.AUTO-MCNC: 0.2 MG/DL
WBC #/AREA URNS HPF: ABNORMAL /HPF

## 2024-01-08 DIAGNOSIS — R93.89 ENDOMETRIAL THICKENING ON ULTRASOUND: ICD-10-CM

## 2024-01-08 DIAGNOSIS — N92.0 MENORRHAGIA WITH REGULAR CYCLE: ICD-10-CM

## 2024-06-11 ENCOUNTER — GYNECOLOGY VISIT (OUTPATIENT)
Dept: OBGYN | Facility: CLINIC | Age: 34
End: 2024-06-11
Payer: COMMERCIAL

## 2024-06-11 VITALS
WEIGHT: 231.3 LBS | DIASTOLIC BLOOD PRESSURE: 65 MMHG | SYSTOLIC BLOOD PRESSURE: 101 MMHG | HEIGHT: 64 IN | BODY MASS INDEX: 39.49 KG/M2

## 2024-06-11 DIAGNOSIS — N92.0 MENORRHAGIA WITH REGULAR CYCLE: Primary | ICD-10-CM

## 2024-06-11 PROCEDURE — 99203 OFFICE O/P NEW LOW 30 MIN: CPT | Performed by: OBSTETRICS & GYNECOLOGY

## 2024-06-11 PROCEDURE — 3078F DIAST BP <80 MM HG: CPT | Performed by: OBSTETRICS & GYNECOLOGY

## 2024-06-11 PROCEDURE — 3074F SYST BP LT 130 MM HG: CPT | Performed by: OBSTETRICS & GYNECOLOGY

## 2024-06-11 RX ORDER — ERGOCALCIFEROL 1.25 MG/1
CAPSULE ORAL
COMMUNITY

## 2024-06-11 RX ORDER — TRANEXAMIC ACID 650 MG/1
1300 TABLET ORAL 3 TIMES DAILY
Qty: 30 TABLET | Refills: 3 | Status: SHIPPED | OUTPATIENT
Start: 2024-06-11

## 2024-06-11 ASSESSMENT — FIBROSIS 4 INDEX: FIB4 SCORE: 0.44

## 2024-06-11 NOTE — PROGRESS NOTES
"New GYN Problem Note    CC:   New Patient      HPI:   Patient is a 33 y.o.  who presents by referral for heavy menses.  She states they are regular (ranging from 25-28 days) but have always been heavy since age 17.  States she was on OCP from [age] and struggled with depression.  She then states that she used the birth control patch between each of her kids but \"got pregnant on the patch\" and also felt like it made her depressed.  Her  has had a vasectomy but she does express consideration for tubal or contraceptive method as sometimes they enter into sexual experiences outside their marriage.  Her PCP recently told her to start ibuprofen to help decrease menstrual flow but she hasn't tried this.  An US was ordered and pt was referred due to endometrial thickening of the lining (1.5cm)      GYNECOLOGIC HISTORY:   Current Sexual Activity: yes  History of sexually transmitted diseases? No  Abnormal discharge? No     Menstrual History  Patient's last menstrual period was Patient's last menstrual period was 2024 (exact date).  Periods are regular  q 25-28 days, lasting 5 days.     Clots or heavy flow: Yes  Intermenstrual bleeding/spotting: No  Sexual problems: No  Significant pelvic pain: No  Bothersome PMS: No  Bothersome menopausal symptoms: No     Contraception   vasectomy     Pap History  Last Pap: 2023  Hx Moderate or Severe Dysplasia : No     Sexually active:    Social History     Substance and Sexual Activity   Sexual Activity Yes    Partners: Female, Male    Birth control/protection: Condom, Male Sterilization       OBSTETRIC HISTORY:  OB History    Para Term  AB Living   3 3 3     3   SAB IAB Ectopic Molar Multiple Live Births             3      # Outcome Date GA Lbr Henrry/2nd Weight Sex Delivery Anes PTL Lv   3 Term 12/27/15 40w0d   F Vag-Spont   MANDO   2 Term 14 41w0d   F Vag-Spont   MANDO   1 Term 12 42w0d   M Vag-Spont   MANDO       MEDICAL HISTORY:  Past " "Medical History:   Diagnosis Date    Breast mass, right 09/22/2023    Benign (FNA), fibrous mass     Bright red rectal bleeding 09/22/2023    Urinary tract infection        SURGICAL HISTORY:  Past Surgical History:   Procedure Laterality Date    DENTAL EXTRACTION(S)      wisdom       FAMILY HISTORY:  Family History   Problem Relation Age of Onset    Thyroid Mother     Mental Illness Mother         anxiety with panic, PTSD, depression    Other Brother         Down's syndrome    Thyroid Paternal Grandmother     Cancer Paternal Grandmother         liver or pancreas?    Heart Attack Paternal Grandfather 67    Other Daughter         speech delay    Other Son         developmental delay    Breast Cancer Other         mothers aunt    Colon Cancer Neg Hx        ALLERGIES / REACTIONS:  No Known Allergies    SOCIAL HISTORY:   reports that she has never smoked. She has never used smokeless tobacco. She reports that she does not currently use alcohol. She reports current drug use. Drugs: Marijuana and Inhaled.    ROS:   Positive ROS: none  Gen: no fevers or chills, no significant weight loss or gain, excessive fatigue  Respiratory:  no cough or dyspnea  Cardiac:  no chest pain, no palpitations, no syncope  Breast: no breast discharge, pain, lump or skin changes  GI:  no heartburn, no abdominal pain, no nausea or vomiting  Urinary: no dysuria, urgency, frequency, incontinence   Psych: no depression or anxiety  Neuro: no migraines with aura, fainting spells, numbness or tingling  Extremities: no joint pain, persistently swollen ankles, recurrent leg cramps       PHYSICAL EXAMINATION:  Vital Signs:   Vitals:    06/11/24 0952   BP: 101/65   BP Location: Right arm   Patient Position: Sitting   BP Cuff Size: Large adult   Weight: 231 lb 4.8 oz   Height: 5' 4\"     Body mass index is 39.7 kg/m².  Constitutional: The patient is well developed and well nourished.  Psychiatric: Patient is oriented to time place and person.   Skin: No rash " "observed.  Neck: Neck appears symmetric.  Respiratory: normal effort  Abdomen: Soft, non-tender.  Pelvic Exam: exam deferred  Extremeties: Legs are symmetric and without tenderness. There is no edema present.    ASSESSMENT AND PLAN:  33 y.o.       1. Menorrhagia with regular cycle   - TSH wnl 10/2023   - discussed that endometrial lining does not have any meaning pre-menopausally and just reflects growth during that particular time in the cycle; \"thicker\" lining corroborates with her heavier menses   - discussed that hormonal contraception is the best way to decrease menstrual flow in a conservative/non-surgical manner.     - encouraged pt to consider trial of different types of OCP as she was young/adolescent when she took them and she may react differently now. Encouraged her that there are many formulations   - alternatively, IUD is the BEST way to decrease menstrual flow hormonally and also provides long-acting contraception which she clearly desires   - discussed that tubal is highly effective for her sterilization/contraceptive needs but will not address (not help or worsen) her bleeding complaints   - also discussed non-hormonal monthly use of TXA (similar to rec for ibuprofen) to decrease flow   - pt elects to try TXA and will take info on Mirena and consider IUD and/or tubal in the future depending on how TXA works for her and whether she decides to pursue additional contraception over her 's vasectomy    - Tranexamic Acid 650 MG Tab; Take 1,300 mg by mouth in the morning, at noon, and at bedtime. For the first 5 days of menses  Dispense: 30 Tablet; Refill: 3        Geetha Gifford D.O.    "

## 2024-06-11 NOTE — PROGRESS NOTES
Patient here for annual exam  Last pap done/result:  LMP: 5/31/2024   BCM: NONE   Last mammogram, if applicable: 2015/2016 a begin fibrous cyst on right breast was found. No longer there.   Phone number: [phone]  Pharmacy verified  Pt states that she has always had heavy bleeding, when she was 17 she was bleeding for 3 weeks but that has evened out. Now she is bleeding 5 days. Pt states she is changing pads every two hours, half filled.

## 2024-07-18 ENCOUNTER — HOSPITAL ENCOUNTER (OUTPATIENT)
Facility: MEDICAL CENTER | Age: 34
End: 2024-07-18
Attending: PHYSICIAN ASSISTANT
Payer: COMMERCIAL

## 2024-07-18 PROCEDURE — 87086 URINE CULTURE/COLONY COUNT: CPT

## 2024-07-20 LAB
BACTERIA UR CULT: NORMAL
SIGNIFICANT IND 70042: NORMAL
SITE SITE: NORMAL
SOURCE SOURCE: NORMAL

## 2024-11-04 ENCOUNTER — HOSPITAL ENCOUNTER (OUTPATIENT)
Dept: LAB | Facility: MEDICAL CENTER | Age: 34
End: 2024-11-04
Attending: STUDENT IN AN ORGANIZED HEALTH CARE EDUCATION/TRAINING PROGRAM
Payer: COMMERCIAL

## 2024-11-04 DIAGNOSIS — N92.0 MENORRHAGIA WITH REGULAR CYCLE: ICD-10-CM

## 2024-11-04 DIAGNOSIS — E78.5 HYPERLIPIDEMIA, UNSPECIFIED HYPERLIPIDEMIA TYPE: ICD-10-CM

## 2024-11-04 DIAGNOSIS — Z11.3 ENCOUNTER FOR SPECIAL SCREENING EXAMINATION FOR INFECTION WITH PREDOMINANTLY SEXUAL MODE OF TRANSMISSION: ICD-10-CM

## 2024-11-04 DIAGNOSIS — E55.9 VITAMIN D DEFICIENCY: ICD-10-CM

## 2024-11-04 LAB
25(OH)D3 SERPL-MCNC: 24 NG/ML (ref 30–100)
ALBUMIN SERPL BCP-MCNC: 4.3 G/DL (ref 3.2–4.9)
ALBUMIN/GLOB SERPL: 1.3 G/DL
ALP SERPL-CCNC: 91 U/L (ref 30–99)
ALT SERPL-CCNC: 18 U/L (ref 2–50)
ANION GAP SERPL CALC-SCNC: 12 MMOL/L (ref 7–16)
AST SERPL-CCNC: 26 U/L (ref 12–45)
BASOPHILS # BLD AUTO: 0.5 % (ref 0–1.8)
BASOPHILS # BLD: 0.04 K/UL (ref 0–0.12)
BILIRUB SERPL-MCNC: 0.3 MG/DL (ref 0.1–1.5)
BUN SERPL-MCNC: 15 MG/DL (ref 8–22)
C TRACH DNA SPEC QL NAA+PROBE: NEGATIVE
CALCIUM ALBUM COR SERPL-MCNC: 9.7 MG/DL (ref 8.5–10.5)
CALCIUM SERPL-MCNC: 9.9 MG/DL (ref 8.5–10.5)
CHLORIDE SERPL-SCNC: 107 MMOL/L (ref 96–112)
CHOLEST SERPL-MCNC: 213 MG/DL (ref 100–199)
CO2 SERPL-SCNC: 19 MMOL/L (ref 20–33)
CREAT SERPL-MCNC: 0.8 MG/DL (ref 0.5–1.4)
EOSINOPHIL # BLD AUTO: 0.15 K/UL (ref 0–0.51)
EOSINOPHIL NFR BLD: 1.7 % (ref 0–6.9)
ERYTHROCYTE [DISTWIDTH] IN BLOOD BY AUTOMATED COUNT: 44.2 FL (ref 35.9–50)
GFR SERPLBLD CREATININE-BSD FMLA CKD-EPI: 100 ML/MIN/1.73 M 2
GLOBULIN SER CALC-MCNC: 3.3 G/DL (ref 1.9–3.5)
GLUCOSE SERPL-MCNC: 82 MG/DL (ref 65–99)
HBV SURFACE AG SER QL: NORMAL
HCT VFR BLD AUTO: 44 % (ref 37–47)
HCV AB SER QL: NORMAL
HDLC SERPL-MCNC: 62 MG/DL
HGB BLD-MCNC: 14.6 G/DL (ref 12–16)
HIV 1+2 AB+HIV1 P24 AG SERPL QL IA: NORMAL
IMM GRANULOCYTES # BLD AUTO: 0.02 K/UL (ref 0–0.11)
IMM GRANULOCYTES NFR BLD AUTO: 0.2 % (ref 0–0.9)
LDLC SERPL CALC-MCNC: 138 MG/DL
LYMPHOCYTES # BLD AUTO: 2.66 K/UL (ref 1–4.8)
LYMPHOCYTES NFR BLD: 30.4 % (ref 22–41)
MCH RBC QN AUTO: 30.3 PG (ref 27–33)
MCHC RBC AUTO-ENTMCNC: 33.2 G/DL (ref 32.2–35.5)
MCV RBC AUTO: 91.3 FL (ref 81.4–97.8)
MONOCYTES # BLD AUTO: 0.49 K/UL (ref 0–0.85)
MONOCYTES NFR BLD AUTO: 5.6 % (ref 0–13.4)
N GONORRHOEA DNA SPEC QL NAA+PROBE: NEGATIVE
NEUTROPHILS # BLD AUTO: 5.39 K/UL (ref 1.82–7.42)
NEUTROPHILS NFR BLD: 61.6 % (ref 44–72)
NRBC # BLD AUTO: 0 K/UL
NRBC BLD-RTO: 0 /100 WBC (ref 0–0.2)
PLATELET # BLD AUTO: 324 K/UL (ref 164–446)
PMV BLD AUTO: 11.2 FL (ref 9–12.9)
POTASSIUM SERPL-SCNC: 4 MMOL/L (ref 3.6–5.5)
PROT SERPL-MCNC: 7.6 G/DL (ref 6–8.2)
RBC # BLD AUTO: 4.82 M/UL (ref 4.2–5.4)
SODIUM SERPL-SCNC: 138 MMOL/L (ref 135–145)
SPECIMEN SOURCE: NORMAL
T PALLIDUM AB SER QL IA: NORMAL
TRIGL SERPL-MCNC: 65 MG/DL (ref 0–149)
TSH SERPL DL<=0.005 MIU/L-ACNC: 1.96 UIU/ML (ref 0.38–5.33)
WBC # BLD AUTO: 8.8 K/UL (ref 4.8–10.8)

## 2024-11-04 PROCEDURE — 85025 COMPLETE CBC W/AUTO DIFF WBC: CPT

## 2024-11-04 PROCEDURE — 87389 HIV-1 AG W/HIV-1&-2 AB AG IA: CPT

## 2024-11-04 PROCEDURE — 80061 LIPID PANEL: CPT

## 2024-11-04 PROCEDURE — 82306 VITAMIN D 25 HYDROXY: CPT

## 2024-11-04 PROCEDURE — 87491 CHLMYD TRACH DNA AMP PROBE: CPT

## 2024-11-04 PROCEDURE — 84443 ASSAY THYROID STIM HORMONE: CPT

## 2024-11-04 PROCEDURE — 87340 HEPATITIS B SURFACE AG IA: CPT

## 2024-11-04 PROCEDURE — 36415 COLL VENOUS BLD VENIPUNCTURE: CPT

## 2024-11-04 PROCEDURE — 80053 COMPREHEN METABOLIC PANEL: CPT

## 2024-11-04 PROCEDURE — 87591 N.GONORRHOEAE DNA AMP PROB: CPT

## 2024-11-04 PROCEDURE — 86780 TREPONEMA PALLIDUM: CPT

## 2024-11-04 PROCEDURE — 86803 HEPATITIS C AB TEST: CPT

## 2024-11-12 ENCOUNTER — OFFICE VISIT (OUTPATIENT)
Dept: MEDICAL GROUP | Facility: LAB | Age: 34
End: 2024-11-12
Payer: COMMERCIAL

## 2024-11-12 VITALS
BODY MASS INDEX: 38.93 KG/M2 | HEART RATE: 70 BPM | TEMPERATURE: 98.4 F | RESPIRATION RATE: 20 BRPM | HEIGHT: 64 IN | DIASTOLIC BLOOD PRESSURE: 62 MMHG | SYSTOLIC BLOOD PRESSURE: 90 MMHG | OXYGEN SATURATION: 100 % | WEIGHT: 228 LBS

## 2024-11-12 DIAGNOSIS — E55.9 VITAMIN D INSUFFICIENCY: ICD-10-CM

## 2024-11-12 DIAGNOSIS — Z00.00 ENCOUNTER FOR ANNUAL GENERAL MEDICAL EXAMINATION WITHOUT ABNORMAL FINDINGS IN ADULT: ICD-10-CM

## 2024-11-12 DIAGNOSIS — Z11.3 ENCOUNTER FOR SPECIAL SCREENING EXAMINATION FOR INFECTION WITH PREDOMINANTLY SEXUAL MODE OF TRANSMISSION: ICD-10-CM

## 2024-11-12 DIAGNOSIS — E78.5 HYPERLIPIDEMIA, UNSPECIFIED HYPERLIPIDEMIA TYPE: ICD-10-CM

## 2024-11-12 PROBLEM — J01.00 ACUTE MAXILLARY SINUSITIS: Status: RESOLVED | Noted: 2024-09-18 | Resolved: 2024-11-12

## 2024-11-12 PROBLEM — J01.00 ACUTE MAXILLARY SINUSITIS: Status: ACTIVE | Noted: 2024-09-18

## 2024-11-12 PROBLEM — N92.0 MENORRHAGIA WITH REGULAR CYCLE: Status: RESOLVED | Noted: 2023-10-23 | Resolved: 2024-11-12

## 2024-11-12 PROCEDURE — 99395 PREV VISIT EST AGE 18-39: CPT | Performed by: STUDENT IN AN ORGANIZED HEALTH CARE EDUCATION/TRAINING PROGRAM

## 2024-11-12 PROCEDURE — 3078F DIAST BP <80 MM HG: CPT | Performed by: STUDENT IN AN ORGANIZED HEALTH CARE EDUCATION/TRAINING PROGRAM

## 2024-11-12 PROCEDURE — 3074F SYST BP LT 130 MM HG: CPT | Performed by: STUDENT IN AN ORGANIZED HEALTH CARE EDUCATION/TRAINING PROGRAM

## 2024-11-12 RX ORDER — MULTIVIT-MIN/IRON/FOLIC ACID/K 18-600-40
1 CAPSULE ORAL DAILY
COMMUNITY

## 2024-11-12 SDOH — ECONOMIC STABILITY: TRANSPORTATION INSECURITY
IN THE PAST 12 MONTHS, HAS THE LACK OF TRANSPORTATION KEPT YOU FROM MEDICAL APPOINTMENTS OR FROM GETTING MEDICATIONS?: PATIENT DECLINED

## 2024-11-12 SDOH — ECONOMIC STABILITY: INCOME INSECURITY: HOW HARD IS IT FOR YOU TO PAY FOR THE VERY BASICS LIKE FOOD, HOUSING, MEDICAL CARE, AND HEATING?: PATIENT DECLINED

## 2024-11-12 SDOH — ECONOMIC STABILITY: TRANSPORTATION INSECURITY
IN THE PAST 12 MONTHS, HAS LACK OF RELIABLE TRANSPORTATION KEPT YOU FROM MEDICAL APPOINTMENTS, MEETINGS, WORK OR FROM GETTING THINGS NEEDED FOR DAILY LIVING?: PATIENT DECLINED

## 2024-11-12 SDOH — ECONOMIC STABILITY: FOOD INSECURITY: WITHIN THE PAST 12 MONTHS, THE FOOD YOU BOUGHT JUST DIDN'T LAST AND YOU DIDN'T HAVE MONEY TO GET MORE.: PATIENT DECLINED

## 2024-11-12 SDOH — HEALTH STABILITY: PHYSICAL HEALTH: ON AVERAGE, HOW MANY DAYS PER WEEK DO YOU ENGAGE IN MODERATE TO STRENUOUS EXERCISE (LIKE A BRISK WALK)?: 6 DAYS

## 2024-11-12 SDOH — ECONOMIC STABILITY: FOOD INSECURITY: WITHIN THE PAST 12 MONTHS, YOU WORRIED THAT YOUR FOOD WOULD RUN OUT BEFORE YOU GOT MONEY TO BUY MORE.: PATIENT DECLINED

## 2024-11-12 SDOH — HEALTH STABILITY: PHYSICAL HEALTH: ON AVERAGE, HOW MANY MINUTES DO YOU ENGAGE IN EXERCISE AT THIS LEVEL?: 40 MIN

## 2024-11-12 SDOH — ECONOMIC STABILITY: TRANSPORTATION INSECURITY
IN THE PAST 12 MONTHS, HAS LACK OF TRANSPORTATION KEPT YOU FROM MEETINGS, WORK, OR FROM GETTING THINGS NEEDED FOR DAILY LIVING?: PATIENT DECLINED

## 2024-11-12 SDOH — ECONOMIC STABILITY: INCOME INSECURITY: IN THE LAST 12 MONTHS, WAS THERE A TIME WHEN YOU WERE NOT ABLE TO PAY THE MORTGAGE OR RENT ON TIME?: PATIENT DECLINED

## 2024-11-12 SDOH — ECONOMIC STABILITY: HOUSING INSECURITY
IN THE LAST 12 MONTHS, WAS THERE A TIME WHEN YOU DID NOT HAVE A STEADY PLACE TO SLEEP OR SLEPT IN A SHELTER (INCLUDING NOW)?: PATIENT DECLINED

## 2024-11-12 ASSESSMENT — LIFESTYLE VARIABLES
HOW OFTEN DO YOU HAVE A DRINK CONTAINING ALCOHOL: 2-4 TIMES A MONTH
HOW MANY STANDARD DRINKS CONTAINING ALCOHOL DO YOU HAVE ON A TYPICAL DAY: 1 OR 2
SKIP TO QUESTIONS 9-10: 0
HOW OFTEN DO YOU HAVE SIX OR MORE DRINKS ON ONE OCCASION: LESS THAN MONTHLY
AUDIT-C TOTAL SCORE: 3

## 2024-11-12 ASSESSMENT — SOCIAL DETERMINANTS OF HEALTH (SDOH)
HOW OFTEN DO YOU ATTEND CHURCH OR RELIGIOUS SERVICES?: PATIENT DECLINED
IN THE PAST 12 MONTHS, HAS THE ELECTRIC, GAS, OIL, OR WATER COMPANY THREATENED TO SHUT OFF SERVICE IN YOUR HOME?: NO
HOW MANY DRINKS CONTAINING ALCOHOL DO YOU HAVE ON A TYPICAL DAY WHEN YOU ARE DRINKING: 1 OR 2
DO YOU BELONG TO ANY CLUBS OR ORGANIZATIONS SUCH AS CHURCH GROUPS UNIONS, FRATERNAL OR ATHLETIC GROUPS, OR SCHOOL GROUPS?: PATIENT DECLINED
HOW OFTEN DO YOU ATTENT MEETINGS OF THE CLUB OR ORGANIZATION YOU BELONG TO?: PATIENT DECLINED
HOW OFTEN DO YOU ATTENT MEETINGS OF THE CLUB OR ORGANIZATION YOU BELONG TO?: PATIENT DECLINED
HOW OFTEN DO YOU HAVE A DRINK CONTAINING ALCOHOL: 2-4 TIMES A MONTH
IN A TYPICAL WEEK, HOW MANY TIMES DO YOU TALK ON THE PHONE WITH FAMILY, FRIENDS, OR NEIGHBORS?: PATIENT DECLINED
HOW HARD IS IT FOR YOU TO PAY FOR THE VERY BASICS LIKE FOOD, HOUSING, MEDICAL CARE, AND HEATING?: PATIENT DECLINED
DO YOU BELONG TO ANY CLUBS OR ORGANIZATIONS SUCH AS CHURCH GROUPS UNIONS, FRATERNAL OR ATHLETIC GROUPS, OR SCHOOL GROUPS?: PATIENT DECLINED
HOW OFTEN DO YOU GET TOGETHER WITH FRIENDS OR RELATIVES?: PATIENT DECLINED
WITHIN THE PAST 12 MONTHS, YOU WORRIED THAT YOUR FOOD WOULD RUN OUT BEFORE YOU GOT THE MONEY TO BUY MORE: PATIENT DECLINED
IN A TYPICAL WEEK, HOW MANY TIMES DO YOU TALK ON THE PHONE WITH FAMILY, FRIENDS, OR NEIGHBORS?: PATIENT DECLINED
HOW OFTEN DO YOU HAVE SIX OR MORE DRINKS ON ONE OCCASION: LESS THAN MONTHLY
HOW OFTEN DO YOU ATTEND CHURCH OR RELIGIOUS SERVICES?: PATIENT DECLINED
HOW OFTEN DO YOU GET TOGETHER WITH FRIENDS OR RELATIVES?: PATIENT DECLINED

## 2024-11-12 ASSESSMENT — ENCOUNTER SYMPTOMS
CONSTIPATION: 0
NERVOUS/ANXIOUS: 0
COUGH: 0
SHORTNESS OF BREATH: 0
BLOOD IN STOOL: 0
VOMITING: 0
NAUSEA: 0
CHILLS: 0
DIARRHEA: 0
FEVER: 0
HEADACHES: 0
DEPRESSION: 0
ABDOMINAL PAIN: 0
WEIGHT LOSS: 1

## 2024-11-12 ASSESSMENT — PATIENT HEALTH QUESTIONNAIRE - PHQ9: CLINICAL INTERPRETATION OF PHQ2 SCORE: 0

## 2024-11-12 ASSESSMENT — FIBROSIS 4 INDEX: FIB4 SCORE: 0.61

## 2024-11-12 NOTE — PATIENT INSTRUCTIONS
Vaccines due that can be received only at pharmacy:  COVID    Aim for goal of 150 minutes a week of moderate intensity exercise (ex 30 minutes 5 times a week)    fasting labs due 1-2 weeks prior to next visit in a year

## 2024-11-12 NOTE — PROGRESS NOTES
Subjective:     Chief Complaint   Patient presents with    Annual Exam       HPI:   Emmie Mohamud is a 33 y.o. female who presents for annual exam.    Verbal consent was acquired by the patient to use [employer] ambient listening note generation during this visit: YES    History of Present Illness  The patient is a 33-year-old female here for her annual exam.    She has not used the prescribed tranexamic acid since her menstrual cycles have since lightened after her visit with gynecology. Her menstrual cycle lasts 4 to 5 days and is regular. She also reports no post-coital or intermenstrual bleeding.    She is not currently taking a multivitamin but is on vitamin D, magnesium, and D-mannose. She takes magnesium 450 mg daily, split into one dose in the morning and two at night. She has made dietary changes and increased her physical activity, resulting in a weight loss of about 15 pounds, which she believes has positively impacted her menstrual cycle. She has been taking a probiotic daily for the past 2 to 3 years.    She reports no urinary tract infections and always urinates after intercourse. She reports no hearing issues and is up-to-date with her eye doctor visits. She maintains good oral hygiene, but has not seen a dentist in 2-3yrs. She uses sunscreen on her face.    Ob-Gyn/ History:    Patient has GYN provider: yes, Geetha Gifford prior. Interested in BTL.  /Para:   History of abnormal pap smears: no  Gyn Surgery: Reviewed  Current Contraceptive Method: condoms (if partner other than spouse) and vasectomy. Sexually active: yes.   Dyspareunia: no.  Urinary incontinence: no    Menses regular with improved bleeding, cramping. Last 4-5 days.   No significant bloating/fluid retention, pelvic pain, or abnormal vaginal discharge.     Health Maintenance  Osteoporosis Screen/ DEXA: Due at 66  Diet/exercise: Reviewed.   Substance Abuse: ETOH-2-4 times a month, 1-2 servings. Drugs-Marijuana  infrequently. Tobacco use-denies.  Cholesterol Screening:   Lab Results   Component Value Date     (H) 11/04/2024   Diabetes Screening:   Lab Results   Component Value Date    HBA1C 5.1 10/05/2023   Aspirin Use: Not indicated The ASCVD Risk score (Katia TOLLIVER, et al., 2019) failed to calculate.    Cancer screening  Colorectal Cancer Screening: Due at 45.   Lung Cancer Screening: Not indicated   Cervical Cancer Screening: Up to date  Breast Cancer Screening: Due at 40.     Infectious disease screening/Immunizations  --Immunizations: Reviewed with patient.   --STI Screening: UTD, Patient requests annually.  --Practices safe sex: yes  --HIV Screening: Complete/negative prior. Patient requests annually.  --Hepatitis C Screening: Complete/negative prior Patient requests annually.    She  has a past medical history of Acute maxillary sinusitis (09/18/2024), Breast mass, right (09/22/2023), Bright red rectal bleeding (09/22/2023), Hyperlipidemia, Menorrhagia with regular cycle (10/23/2023), and Urinary tract infection.  She  has a past surgical history that includes dental extraction(s).    Family History   Problem Relation Age of Onset    Thyroid Mother     Mental Illness Mother         anxiety with panic, PTSD, depression    No Known Problems Father     No Known Problems Sister     Other Brother         Down's syndrome    Thyroid Paternal Grandmother     Cancer Paternal Grandmother         liver or pancreas?    Heart Attack Paternal Grandfather 67    Other Daughter         speech delay    Other Son         developmental delay    Breast Cancer Other         mothers aunt    Colon Cancer Neg Hx        Social History     Socioeconomic History    Marital status:      Spouse name: Not on file    Number of children: Not on file    Years of education: Not on file    Highest education level: Master's degree (e.g., MA, MS, [place], MEd, MSW, DILEEP)   Occupational History    Not on file   Tobacco Use    Smoking status: Never     Smokeless tobacco: Never   Vaping Use    Vaping status: Never Used   Substance and Sexual Activity    Alcohol use: Yes     Comment: 2-4 times a month, 1-2 servings    Drug use: Yes     Types: Marijuana, Inhaled     Comment: not often    Sexual activity: Yes     Partners: Female, Male     Birth control/protection: Condom, Male Sterilization   Other Topics Concern    Not on file   Social History Narrative    Not on file     Social Drivers of Health     Financial Resource Strain: Patient Declined (11/12/2024)    Overall Financial Resource Strain (CARDIA)     Difficulty of Paying Living Expenses: Patient declined   Food Insecurity: Patient Declined (11/12/2024)    Hunger Vital Sign     Worried About Running Out of Food in the Last Year: Patient declined     Ran Out of Food in the Last Year: Patient declined   Transportation Needs: Patient Declined (11/12/2024)    PRAPARE - Transportation     Lack of Transportation (Medical): Patient declined     Lack of Transportation (Non-Medical): Patient declined   Physical Activity: Sufficiently Active (11/12/2024)    Exercise Vital Sign     Days of Exercise per Week: 6 days     Minutes of Exercise per Session: 40 min   Stress: No Stress Concern Present (11/12/2024)    [place] [employer] - Occupational Stress Questionnaire     Feeling of Stress : Only a little   Social Connections: Unknown (11/12/2024)    Social Connection and Isolation Panel [NHANES]     Frequency of Communication with Friends and Family: Patient declined     Frequency of Social Gatherings with Friends and Family: Patient declined     Attends [employer] Services: Patient declined     Active Member of Clubs or Organizations: Patient declined     Attends Club or Organization Meetings: Patient declined     Marital Status:    Intimate Partner Violence: Not on file   Housing Stability: Unknown (11/12/2024)    Housing Stability Vital Sign     Unable to Pay for Housing in the Last Year:  "Patient declined     Number of Times Moved in the Last Year: Not on file     Homeless in the Last Year: No       Patient Active Problem List    Diagnosis Date Noted    Vitamin D insufficiency 10/23/2023    Hyperlipemia 10/23/2023    BMI 39.0-39.9,adult 09/22/2023    Class 2 obesity without serious comorbidity with body mass index (BMI) of 39.0 to 39.9 in adult 09/22/2023    History of recurrent UTIs 09/22/2023    Intermittent diarrhea 09/22/2023         Current Outpatient Medications   Medication Sig Dispense Refill    Cholecalciferol (VITAMIN D) 50 MCG (2000 UT) Cap Take 1 Capsule by mouth every day.      D-MANNOSE PO Take 2,000 mg by mouth every day.      MAGNESIUM PO Take  by mouth. 450mg total daily (150mg in the AM and 300mg at night)       No current facility-administered medications for this visit.     No Known Allergies    Review of Systems   Constitutional:  Positive for weight loss. Negative for chills, fever and malaise/fatigue.   HENT:  Negative for hearing loss.    Respiratory:  Negative for cough and shortness of breath.    Cardiovascular:  Negative for chest pain.   Gastrointestinal:  Negative for abdominal pain, blood in stool, constipation, diarrhea, nausea and vomiting.   Skin:  Negative for rash.   Neurological:  Negative for headaches.   Psychiatric/Behavioral:  Negative for depression. The patient is not nervous/anxious.         Objective:     BP 90/62 (BP Location: Left arm, Patient Position: Sitting, BP Cuff Size: Adult)   Pulse 70   Temp 36.9 °C (98.4 °F) (Temporal)   Resp 20   Ht 1.626 m (5' 4\")   Wt 103 kg (228 lb)   SpO2 100%   BMI 39.14 kg/m²   Body mass index is 39.14 kg/m².  Wt Readings from Last 4 Encounters:   11/12/24 103 kg (228 lb)   06/11/24 105 kg (231 lb 4.8 oz)   12/19/23 103 kg (226 lb 12.8 oz)   11/10/23 100 kg (221 lb)       Physical Exam  Vitals reviewed.   Constitutional:       General: She is not in acute distress.     Appearance: Normal appearance. She is obese. " She is not ill-appearing.   HENT:      Head: Normocephalic and atraumatic.      Right Ear: Tympanic membrane, ear canal and external ear normal.      Left Ear: Tympanic membrane, ear canal and external ear normal.      Nose: Nose normal.      Mouth/Throat:      Mouth: Mucous membranes are moist.      Pharynx: No oropharyngeal exudate or posterior oropharyngeal erythema.      Comments: Dentition good  Eyes:      General: No scleral icterus.     Conjunctiva/sclera: Conjunctivae normal.   Cardiovascular:      Rate and Rhythm: Normal rate and regular rhythm.      Heart sounds: Normal heart sounds. No murmur heard.  Pulmonary:      Effort: Pulmonary effort is normal. No respiratory distress.      Breath sounds: Normal breath sounds. No wheezing, rhonchi or rales.   Abdominal:      General: Abdomen is flat. Bowel sounds are normal. There is no distension.      Palpations: Abdomen is soft. There is no mass.      Tenderness: There is no abdominal tenderness. There is no guarding or rebound.   Musculoskeletal:      Cervical back: Normal range of motion and neck supple. No rigidity or tenderness.      Right lower leg: No edema.      Left lower leg: No edema.   Lymphadenopathy:      Cervical: No cervical adenopathy.   Skin:     General: Skin is warm and dry.      Findings: No rash.   Neurological:      General: No focal deficit present.      Mental Status: She is alert and oriented to person, place, and time.      Gait: Gait normal.   Psychiatric:         Mood and Affect: Mood normal.         Behavior: Behavior normal.         Thought Content: Thought content normal.       Labs: Reviewed from 11/4/2024    Assessment and Plan:       1. Encounter for annual general medical examination without abnormal findings in adult  HCM: Reviewed with patient as above.  Immunizations discussed/recommended and patient directed to the pharmacy if vaccines not available in clinic.  Age-appropriate anticipatory guidance discussed: sun screen,  dental visits, healthy diet/exercise and safe sex.    2. Vitamin D insufficiency  Chronic, improving. Continue medication(s) as below.  - VITAMIN D,25 HYDROXY (DEFICIENCY); Future    Cholecalciferol (VITAMIN D) 50 MCG (2000 UT) Cap, Take 1 Capsule by mouth every day., Disp: , Rfl:     3. Hyperlipidemia, unspecified hyperlipidemia type  Chronic, not at threshold for statin and improved. Discussed ideal ranges and ways to improve with lifestyle choices.   - Comp Metabolic Panel; Future  - TSH WITH REFLEX TO FT4; Future  - Lipid Profile; Future  - CBC WITH DIFFERENTIAL; Future    4. Encounter for special screening examination for infection with predominantly sexual mode of transmission  - Chlamydia/GC, PCR (Urine); Future  - HIV AG/AB COMBO ASSAY SCREENING; Future  - HEP C VIRUS ANTIBODY; Future  - HEP B SURFACE ANTIGEN; Future  - T.PALLIDUM AB KAREN (SCREENING); Future    Follow-up: Return in about 1 year (around 11/12/2025), or if symptoms worsen or fail to improve, for Annual.

## 2025-02-21 ENCOUNTER — OFFICE VISIT (OUTPATIENT)
Dept: MEDICAL GROUP | Facility: LAB | Age: 35
End: 2025-02-21
Payer: COMMERCIAL

## 2025-02-21 VITALS
HEIGHT: 64 IN | RESPIRATION RATE: 16 BRPM | DIASTOLIC BLOOD PRESSURE: 66 MMHG | OXYGEN SATURATION: 97 % | WEIGHT: 220 LBS | HEART RATE: 62 BPM | SYSTOLIC BLOOD PRESSURE: 104 MMHG | TEMPERATURE: 97.6 F | BODY MASS INDEX: 37.56 KG/M2

## 2025-02-21 DIAGNOSIS — J34.89 SINUS PRESSURE: ICD-10-CM

## 2025-02-21 DIAGNOSIS — R06.02 SOB (SHORTNESS OF BREATH) ON EXERTION: ICD-10-CM

## 2025-02-21 DIAGNOSIS — R05.1 ACUTE COUGH: ICD-10-CM

## 2025-02-21 DIAGNOSIS — R09.81 NASAL CONGESTION: ICD-10-CM

## 2025-02-21 PROCEDURE — 3078F DIAST BP <80 MM HG: CPT | Performed by: STUDENT IN AN ORGANIZED HEALTH CARE EDUCATION/TRAINING PROGRAM

## 2025-02-21 PROCEDURE — 99213 OFFICE O/P EST LOW 20 MIN: CPT | Performed by: STUDENT IN AN ORGANIZED HEALTH CARE EDUCATION/TRAINING PROGRAM

## 2025-02-21 PROCEDURE — 3074F SYST BP LT 130 MM HG: CPT | Performed by: STUDENT IN AN ORGANIZED HEALTH CARE EDUCATION/TRAINING PROGRAM

## 2025-02-21 RX ORDER — BENZONATATE 100 MG/1
100-200 CAPSULE ORAL 3 TIMES DAILY PRN
Qty: 60 CAPSULE | Refills: 0 | Status: SHIPPED | OUTPATIENT
Start: 2025-02-21

## 2025-02-21 RX ORDER — DEXTROMETHORPHAN HYDROBROMIDE AND PROMETHAZINE HYDROCHLORIDE 15; 6.25 MG/5ML; MG/5ML
5 SYRUP ORAL EVERY 4 HOURS PRN
Qty: 120 ML | Refills: 0 | Status: SHIPPED | OUTPATIENT
Start: 2025-02-21

## 2025-02-21 ASSESSMENT — ENCOUNTER SYMPTOMS
NAUSEA: 0
VOMITING: 0
DIARRHEA: 0
SHORTNESS OF BREATH: 1
FEVER: 0
WEIGHT LOSS: 0
COUGH: 1
ABDOMINAL PAIN: 0
SINUS PAIN: 1
CHILLS: 0
SPUTUM PRODUCTION: 1

## 2025-02-21 ASSESSMENT — PATIENT HEALTH QUESTIONNAIRE - PHQ9: CLINICAL INTERPRETATION OF PHQ2 SCORE: 0

## 2025-02-21 ASSESSMENT — FIBROSIS 4 INDEX: FIB4 SCORE: 0.62

## 2025-02-21 NOTE — PROGRESS NOTES
Verbal consent was acquired by the patient to use [employer] ambient listening note generation during this visit Yes.    Subjective:     Chief Complaint   Patient presents with    Sinus Problem     Intermittent 3 weeks, productive cough, congestion, shortness of breath     HPI:     History of Present Illness  The patient is a 34-year-old female who presents with cough and congestion for 3 weeks.    Her symptoms began 3 weeks ago, initially manifesting as nasal congestion that subsequently spread to her ears. She suspected a sinus infection due to the involvement of her ears and nose. A telehealth consultation resulted in a prescription for amoxicillin, which she took twice daily for a week. Her sore throat has resolved, and her ear symptoms have largely improved. However, she continues to experience nasal congestion, occasional sinus pressure, and a productive cough that started 2 weeks ago. She reports no current headaches but does experience intermittent sinus pain with more consistent sinus pressure. She has not been advised to perform saline rinses. She has not used Flonase in the past few days. She reports no diarrhea but did experience body aches, which have since resolved. She has been attempting to maintain adequate hydration. She has been managing her symptoms with DayQuil, which she finds beneficial, although it does not alleviate her cough. She also tried Flonase as recommended by her doctor, but it did not result in any noticeable improvement.    This week, she began experiencing shortness of breath, particularly when walking or climbing stairs, activities that were previously unproblematic for her. She has no history of asthma and is a non-smoker. She is uncertain about the presence of fever as she has been taking DayQuil. She has been using a humidifier at night, which she believes has improved her sleep quality.    Review of Systems   Constitutional:  Negative for chills, fever, malaise/fatigue and  "weight loss.   HENT:  Positive for congestion and sinus pain.    Respiratory:  Positive for cough, sputum production and shortness of breath.    Cardiovascular:  Negative for chest pain.   Gastrointestinal:  Negative for abdominal pain, diarrhea, nausea and vomiting.   Skin:  Negative for rash.     Objective:     Exam:  /66 (BP Location: Left arm, Patient Position: Sitting, BP Cuff Size: Adult)   Pulse 62   Temp 36.4 °C (97.6 °F) (Temporal)   Resp 16   Ht 1.626 m (5' 4\")   Wt 99.8 kg (220 lb)   SpO2 97% Comment: on ambulation  BMI 37.76 kg/m²  Body mass index is 37.76 kg/m².    Physical Exam  Vitals reviewed.   Constitutional:       General: She is not in acute distress.     Appearance: Normal appearance. She is not ill-appearing.   HENT:      Head: Normocephalic and atraumatic.      Right Ear: Tympanic membrane, ear canal and external ear normal.      Left Ear: Tympanic membrane, ear canal and external ear normal.      Nose: Rhinorrhea present. Rhinorrhea is clear.      Right Sinus: Maxillary sinus tenderness and frontal sinus tenderness present.      Left Sinus: Maxillary sinus tenderness present. No frontal sinus tenderness.      Comments: Mild sinus ttp     Mouth/Throat:      Mouth: Mucous membranes are moist.      Pharynx: No oropharyngeal exudate or posterior oropharyngeal erythema.   Eyes:      Conjunctiva/sclera: Conjunctivae normal.   Cardiovascular:      Rate and Rhythm: Normal rate and regular rhythm.      Heart sounds: Normal heart sounds.   Pulmonary:      Effort: Pulmonary effort is normal. No respiratory distress.      Breath sounds: Normal breath sounds. No wheezing, rhonchi or rales.   Musculoskeletal:      Cervical back: Neck supple. No rigidity or tenderness.      Right lower leg: No edema.      Left lower leg: No edema.   Lymphadenopathy:      Cervical: No cervical adenopathy.   Skin:     General: Skin is warm and dry.      Coloration: Skin is not jaundiced.   Neurological:      " General: No focal deficit present.      Mental Status: She is alert and oriented to person, place, and time.   Psychiatric:         Mood and Affect: Mood normal.         Behavior: Behavior normal.         Thought Content: Thought content normal.       Assessment & Plan:     34 y.o. female with the following -     1. Acute cough  2. SOB (shortness of breath) on exertion  Acute.  No evidence of pneumonia at this time and patient has not had any measured fevers.  Oxygen saturations on ambulation were 97 to 100%.  Suspect viral etiology at this time.  Medication as below provided for cough, discussed potential side effects of promethazine.  Encouraged saline rinses, continued use of humidifier, elevating the head of bed at night and honey as a cough suppressant. If her condition worsens, characterized by the onset of fever, worsened cough or increased difficulty in breathing even at rest, a chest x-ray will be considered.  - benzonatate (TESSALON) 100 MG Cap; Take 1-2 Capsules by mouth 3 times a day as needed for Cough.  Dispense: 60 Capsule; Refill: 0  - promethazine-dextromethorphan (PROMETHAZINE-DM) 6.25-15 MG/5ML syrup; Take 5 mL by mouth every four hours as needed for Cough (max 30ml/day).  Dispense: 120 mL; Refill: 0    3. Sinus pressure  4. Nasal congestion  Acute, somewhat improved.  Received amoxicillin from telehealth. She is advised to continue using Flonase and to perform saline rinses to alleviate congestion and prevent a potential bacterial sinus infection. If symptoms indicative of a bacterial sinus infection emerge, doxycycline or Levaquin may be considered.    Return if symptoms worsen or fail to improve.    Please note that this dictation was created using voice recognition software. I have made every reasonable attempt to correct obvious errors, but I expect that there are errors of grammar and possibly content that I did not discover before finalizing the note.